# Patient Record
Sex: FEMALE | Race: WHITE | NOT HISPANIC OR LATINO | Employment: OTHER | RURAL
[De-identification: names, ages, dates, MRNs, and addresses within clinical notes are randomized per-mention and may not be internally consistent; named-entity substitution may affect disease eponyms.]

---

## 2017-02-23 ENCOUNTER — OFFICE VISIT (OUTPATIENT)
Dept: CARDIOLOGY | Facility: CLINIC | Age: 72
End: 2017-02-23

## 2017-02-23 VITALS
WEIGHT: 220 LBS | HEIGHT: 61 IN | DIASTOLIC BLOOD PRESSURE: 80 MMHG | BODY MASS INDEX: 41.54 KG/M2 | SYSTOLIC BLOOD PRESSURE: 138 MMHG | HEART RATE: 59 BPM

## 2017-02-23 DIAGNOSIS — I25.10 CORONARY ARTERY DISEASE INVOLVING NATIVE CORONARY ARTERY OF NATIVE HEART WITHOUT ANGINA PECTORIS: ICD-10-CM

## 2017-02-23 DIAGNOSIS — I25.2 OLD MI (MYOCARDIAL INFARCTION): Primary | ICD-10-CM

## 2017-02-23 PROCEDURE — 99213 OFFICE O/P EST LOW 20 MIN: CPT | Performed by: INTERNAL MEDICINE

## 2017-02-23 PROCEDURE — 93000 ELECTROCARDIOGRAM COMPLETE: CPT | Performed by: INTERNAL MEDICINE

## 2017-02-23 RX ORDER — CLONAZEPAM 0.5 MG/1
0.5 TABLET ORAL 2 TIMES DAILY
Refills: 1 | COMMUNITY
Start: 2017-01-12

## 2017-02-23 NOTE — PROGRESS NOTES
Subjective:     Encounter Date:02/23/2017      Patient ID: Rachana Membreno is a 71 y.o. female.    Chief Complaint: old MI, CAD, obesity    History of Present Illness     Dear Dr. Medrano,     I had the pleasure of seeing your patient, Rachana Membreno in cardiac followup today.  As you well know, she is a neida 71-year-old woman with history of coronary artery disease status post prior myocardial infarction.  She has had three stents placed.  Her LV function has returned to normal.      Six months ago when I last saw her she was recovering from a respiratory infection.  This has since resolved and she has not had any recurrences.      In general she describes a pretty sedentary lifestyle.  She gets out twice weekly but does not seem to be able to do chores or to get around very much without her walker.  At her current level of activity, she denies any complaints of angina or palpitations.          Review of Systems   All other systems reviewed and are negative.        ECG 12 Lead  Date/Time: 2/23/2017 12:12 PM  Performed by: CARLIN ANG  Authorized by: CARLIN ANG   Comparison: compared with previous ECG   Similar to previous ECG  Rhythm: sinus rhythm  BPM: 59  QRS axis: left  Other findings: PRWP               Objective:     Physical Exam   Constitutional: She is oriented to person, place, and time. She appears well-developed and well-nourished.   HENT:   Head: Normocephalic and atraumatic.   Neck: Normal range of motion. Neck supple.   Cardiovascular: Normal rate, regular rhythm and normal heart sounds.    Pulmonary/Chest: Effort normal and breath sounds normal.   Abdominal: Soft. Bowel sounds are normal.   Musculoskeletal: Normal range of motion.   Neurological: She is alert and oriented to person, place, and time.   Skin: Skin is warm and dry.   Psychiatric: She has a normal mood and affect. Her behavior is normal. Thought content normal.   Vitals reviewed.      Lab Review:       Assessment:           Diagnosis Plan   1. Old MI (myocardial infarction)     2. Coronary artery disease involving native coronary artery of native heart without angina pectoris            Plan:        It was a pleasure to see your patient in cardiac followup today.  She seems to be stable from the cardiac standpoint without any complaints of angina or heart failure.  I think that the biggest thing she can do to improve her situation is to improve her physical capacity.  I have given her several suggestions on how to exercise more.  She will see me again in six months or sooner if symptoms warrant.      Coronary Artery Disease  Assessment  • The patient has no angina    Plan  • Lifestyle modifications discussed include adhering to a heart healthy diet, avoidance of tobacco products, maintenance of a healthy weight, medication compliance, regular exercise and regular monitoring of cholesterol and blood pressure    Subjective - Objective  • There is a history of past MI  • There has been a previous stent procedure using NIK  • Current antiplatelet therapy includes aspirin 81 mg

## 2017-08-24 ENCOUNTER — OFFICE VISIT (OUTPATIENT)
Dept: CARDIOLOGY | Facility: CLINIC | Age: 72
End: 2017-08-24

## 2017-08-24 VITALS
BODY MASS INDEX: 44.56 KG/M2 | WEIGHT: 236 LBS | HEART RATE: 68 BPM | SYSTOLIC BLOOD PRESSURE: 140 MMHG | DIASTOLIC BLOOD PRESSURE: 82 MMHG | HEIGHT: 61 IN

## 2017-08-24 DIAGNOSIS — I25.10 CORONARY ARTERY DISEASE INVOLVING NATIVE CORONARY ARTERY OF NATIVE HEART WITHOUT ANGINA PECTORIS: ICD-10-CM

## 2017-08-24 DIAGNOSIS — I25.2 OLD MI (MYOCARDIAL INFARCTION): Primary | ICD-10-CM

## 2017-08-24 PROCEDURE — 93000 ELECTROCARDIOGRAM COMPLETE: CPT | Performed by: INTERNAL MEDICINE

## 2017-08-24 PROCEDURE — 99213 OFFICE O/P EST LOW 20 MIN: CPT | Performed by: INTERNAL MEDICINE

## 2017-08-24 RX ORDER — ALENDRONATE SODIUM 70 MG/1
70 TABLET ORAL
COMMUNITY
End: 2019-12-18

## 2017-08-24 RX ORDER — POTASSIUM CHLORIDE 750 MG/1
10 TABLET, FILM COATED, EXTENDED RELEASE ORAL 2 TIMES DAILY
COMMUNITY
End: 2019-09-11

## 2018-02-08 ENCOUNTER — OFFICE VISIT (OUTPATIENT)
Dept: CARDIOLOGY | Facility: CLINIC | Age: 73
End: 2018-02-08

## 2018-02-08 VITALS
SYSTOLIC BLOOD PRESSURE: 128 MMHG | HEART RATE: 71 BPM | BODY MASS INDEX: 43.23 KG/M2 | DIASTOLIC BLOOD PRESSURE: 66 MMHG | HEIGHT: 61 IN | WEIGHT: 229 LBS

## 2018-02-08 DIAGNOSIS — I25.2 OLD MI (MYOCARDIAL INFARCTION): Primary | ICD-10-CM

## 2018-02-08 DIAGNOSIS — I25.10 CORONARY ARTERY DISEASE INVOLVING NATIVE CORONARY ARTERY OF NATIVE HEART WITHOUT ANGINA PECTORIS: ICD-10-CM

## 2018-02-08 PROCEDURE — 99213 OFFICE O/P EST LOW 20 MIN: CPT | Performed by: INTERNAL MEDICINE

## 2018-02-08 PROCEDURE — 93000 ELECTROCARDIOGRAM COMPLETE: CPT | Performed by: INTERNAL MEDICINE

## 2018-02-08 NOTE — PROGRESS NOTES
Subjective:     Encounter Date:02/08/2018      Patient ID: Rachana Membreno is a 72 y.o. female.    Chief Complaint: CAD, old MI    History of Present Illness    Dear Dr. Medrano,     I had the pleasure of seeing your patient in cardiac followup today.  As you well know, she is a neida 72-year-old woman with history of obesity.  She has chronic obstructive pulmonary disease and is on chronic oxygen therapy.  She has coronary artery disease status post prior myocardial infarction.  She has had three stents placed.  Her LV function has returned to normal.      She comes in for her six month followup.  Over the past six months she reports doing great.  She has no change in her overall status.  She walks with a walker.  She can do self-care including bathing.  She does not do much in the way of chores around the house.      She denies any complaints of angina or heart failure.  She has had no illnesses since I have last seen her.          Review of Systems   All other systems reviewed and are negative.        ECG 12 Lead  Date/Time: 2/8/2018 10:48 AM  Performed by: CARLIN ANG  Authorized by: CARLIN ANG   Comparison: compared with previous ECG   Similar to previous ECG  Rhythm: sinus rhythm  BPM: 71  Other findings: PRWP               Objective:     Physical Exam   Constitutional: She is oriented to person, place, and time. She appears well-developed and well-nourished.   HENT:   Head: Normocephalic and atraumatic.   Neck: Normal range of motion. Neck supple.   Cardiovascular: Normal rate, regular rhythm and normal heart sounds.    Pulmonary/Chest: Effort normal and breath sounds normal.   Abdominal: Soft. Bowel sounds are normal.   Musculoskeletal: Normal range of motion.   Neurological: She is alert and oriented to person, place, and time.   Skin: Skin is warm and dry.   Psychiatric: She has a normal mood and affect. Her behavior is normal. Thought content normal.   Vitals reviewed.      Lab Review:        Assessment:          Diagnosis Plan   1. Old MI (myocardial infarction)     2. Coronary artery disease involving native coronary artery of native heart without angina pectoris            Plan:       It was a pleasure to see your patient in cardiac followup today.  She is stable from the cardiac standpoint without any complaints to suggest angina or heart failure.  Her COPD is stable.  I have not made any changes in her regimen.  I will see her again in six months or sooner if symptoms warrant.      Coronary Artery Disease  Assessment  • The patient has no angina    Plan  • Lifestyle modifications discussed include adhering to a heart healthy diet, avoidance of tobacco products, maintenance of a healthy weight, medication compliance, regular exercise and regular monitoring of cholesterol and blood pressure    Subjective - Objective  • There is a history of past MI  • There has been a previous stent procedure using NIK  • Current antiplatelet therapy includes aspirin 81 mg

## 2018-08-09 ENCOUNTER — OFFICE VISIT (OUTPATIENT)
Dept: CARDIOLOGY | Facility: CLINIC | Age: 73
End: 2018-08-09

## 2018-08-09 VITALS
BODY MASS INDEX: 42.48 KG/M2 | SYSTOLIC BLOOD PRESSURE: 124 MMHG | DIASTOLIC BLOOD PRESSURE: 68 MMHG | WEIGHT: 225 LBS | HEIGHT: 61 IN | HEART RATE: 73 BPM

## 2018-08-09 DIAGNOSIS — I25.2 OLD MI (MYOCARDIAL INFARCTION): ICD-10-CM

## 2018-08-09 DIAGNOSIS — I25.10 CORONARY ARTERY DISEASE INVOLVING NATIVE CORONARY ARTERY OF NATIVE HEART WITHOUT ANGINA PECTORIS: Primary | ICD-10-CM

## 2018-08-09 PROCEDURE — 99213 OFFICE O/P EST LOW 20 MIN: CPT | Performed by: INTERNAL MEDICINE

## 2018-08-09 PROCEDURE — 93000 ELECTROCARDIOGRAM COMPLETE: CPT | Performed by: INTERNAL MEDICINE

## 2018-08-09 RX ORDER — SILODOSIN 8 MG/1
8 CAPSULE ORAL
COMMUNITY
End: 2019-09-11

## 2018-08-09 NOTE — PROGRESS NOTES
Subjective:     Encounter Date:08/09/2018      Patient ID: Rachana Mmebreno is a 73 y.o. female.    Chief Complaint: CAD, old MI    History of Present Illness    Dear Dr. Medrano:    I had the pleasure of seeing your patient in cardiac followup today. As you well know, she is a neida 73-year-old woman with history of coronary artery disease and prior myocardial infarction. She had 3 stents placed. Her LV function returned to normal.    She has chronic obstructive pulmonary disease and is on chronic oxygen therapy at 3L. She gets around with a walker. She does self-care as her main level of activity.    She comes in for her 6-month followup.  Since I have last seen her, she reports doing quite well. She has a carotid stenosis, followed by Dr. Cook. He recommends conservative management.        Review of Systems   All other systems reviewed and are negative.        ECG 12 Lead  Date/Time: 8/9/2018 11:00 AM  Performed by: CARLIN ANG  Authorized by: CARLIN ANG   Comparison: compared with previous ECG   Similar to previous ECG  Rhythm: sinus rhythm  BPM: 73  Other findings: PRWP               Objective:     Physical Exam   Constitutional: She is oriented to person, place, and time. She appears well-developed and well-nourished.   HENT:   Head: Normocephalic and atraumatic.   Neck: Normal range of motion. Neck supple.   Cardiovascular: Normal rate, regular rhythm and normal heart sounds.    Chronic LE edema   Pulmonary/Chest: Effort normal. She has wheezes in the right middle field and the left middle field.   Abdominal: Soft. Bowel sounds are normal.   Musculoskeletal: Normal range of motion.   Neurological: She is alert and oriented to person, place, and time.   Skin: Skin is warm and dry.   Psychiatric: She has a normal mood and affect. Her behavior is normal. Thought content normal.   Vitals reviewed.      Lab Review:       Assessment:          Diagnosis Plan   1. Coronary artery disease involving  native coronary artery of native heart without angina pectoris     2. Old MI (myocardial infarction)            Plan:       It was a pleasure to see your patient in cardiac followup today.  She is doing very well from the cardiac standpoint without any complaints of angina or heart failure.  I have recommended no changes to her medical regimen.  She will see me again in six months or sooner if symptoms warrant.      Coronary Artery Disease  Assessment  • The patient has no angina    Plan  • Lifestyle modifications discussed include adhering to a heart healthy diet, avoidance of tobacco products, maintenance of a healthy weight, medication compliance, regular exercise and regular monitoring of cholesterol and blood pressure    Subjective - Objective  • There is a history of past MI  • There has been a previous stent procedure using NIK  • Current antiplatelet therapy includes aspirin 81 mg

## 2019-03-07 ENCOUNTER — OFFICE VISIT (OUTPATIENT)
Dept: CARDIOLOGY | Facility: CLINIC | Age: 74
End: 2019-03-07

## 2019-03-07 VITALS
BODY MASS INDEX: 44.75 KG/M2 | HEIGHT: 61 IN | HEART RATE: 68 BPM | DIASTOLIC BLOOD PRESSURE: 78 MMHG | WEIGHT: 237 LBS | SYSTOLIC BLOOD PRESSURE: 128 MMHG

## 2019-03-07 DIAGNOSIS — I25.10 CORONARY ARTERY DISEASE INVOLVING NATIVE CORONARY ARTERY OF NATIVE HEART WITHOUT ANGINA PECTORIS: Primary | ICD-10-CM

## 2019-03-07 DIAGNOSIS — I25.2 OLD MI (MYOCARDIAL INFARCTION): ICD-10-CM

## 2019-03-07 PROCEDURE — 93000 ELECTROCARDIOGRAM COMPLETE: CPT | Performed by: INTERNAL MEDICINE

## 2019-03-07 PROCEDURE — 99213 OFFICE O/P EST LOW 20 MIN: CPT | Performed by: INTERNAL MEDICINE

## 2019-03-07 RX ORDER — PRIMIDONE 50 MG/1
50 TABLET ORAL NIGHTLY
COMMUNITY
End: 2019-09-11

## 2019-03-07 RX ORDER — NITROFURANTOIN MACROCRYSTALS 50 MG/1
50 CAPSULE ORAL 4 TIMES DAILY
COMMUNITY
End: 2019-09-11

## 2019-03-07 RX ORDER — NITROGLYCERIN 0.4 MG/1
TABLET SUBLINGUAL
Qty: 30 TABLET | Refills: 11 | Status: SHIPPED | OUTPATIENT
Start: 2019-03-07 | End: 2019-09-11 | Stop reason: SDUPTHER

## 2019-03-07 NOTE — PROGRESS NOTES
Subjective:     Encounter Date:03/07/2019      Patient ID: Rachana Membreno is a 73 y.o. female.    Chief Complaint: CAD, old MI    History of Present Illness    Dear Dr. Medrano,     I had the pleasure of seeing Rachana Membreno in cardiac followup today. As you well know, she is a neida 73-year-old woman with history of coronary artery disease status post prior myocardial infarction. She has had 3 stents placed in the past. She has chronic obstructive pulmonary disease and is on oxygen therapy at 3L. She gets around with a walker.     She comes in for her usual 6 month followup. Since I have last seen her things have been quite stable except she has developed gallbladder problems. She also has a new symptom of tightness in her chest lasting about an hour. She says this is unlike her prior anginal complaint. It occurs at rest when she is lying flat and she just waits it out.         Review of Systems   All other systems reviewed and are negative.        ECG 12 Lead  Date/Time: 3/7/2019 10:19 AM  Performed by: Star Jung MD  Authorized by: Star Jung MD   Comparison: compared with previous ECG   Similar to previous ECG  Rhythm: sinus rhythm  BPM: 68  Q waves: III and aVF                   Objective:     Physical Exam   Constitutional: She is oriented to person, place, and time. She appears well-developed and well-nourished.   HENT:   Head: Normocephalic and atraumatic.   Neck: Normal range of motion. Neck supple.   Cardiovascular: Normal rate, regular rhythm and normal heart sounds.   Pulmonary/Chest: Effort normal and breath sounds normal.   Abdominal: Soft. Bowel sounds are normal.   Musculoskeletal: Normal range of motion.   Neurological: She is alert and oriented to person, place, and time.   Skin: Skin is warm and dry.   Psychiatric: She has a normal mood and affect. Her behavior is normal. Thought content normal.   Vitals reviewed.      Lab Review:       Assessment:          Diagnosis Plan    1. Coronary artery disease involving native coronary artery of native heart without angina pectoris     2. Old MI (myocardial infarction)            Plan:       It is a pleasure to see your patient in cardiac followup today. She is a neida 73-year-old woman with prior myocardial infarction and coronary artery disease. She has chronic lung disease and is on home oxygen. She suffers from obesity and has some gallbladder problems.     She has a new complaint of substernal tightness. This could be an anginal complaint but she says it is unlike any prior heart pain she has felt. I have recommended that she try sublingual nitroglycerin to see if it improves matters. If it does, then she may need more aggressive cardiac evaluation. She will contact me if this is the case. If the nitroglycerin does not do anything to relieve her pain, she should look for alternative explanations. She will see me again in 6 months or sooner should symptoms warrant.         Coronary Artery Disease  Assessment  • The patient has no angina    Plan  • Lifestyle modifications discussed include adhering to a heart healthy diet, avoidance of tobacco products, maintenance of a healthy weight, medication compliance, regular exercise and regular monitoring of cholesterol and blood pressure    Subjective - Objective  • There is a history of past MI  • There has been a previous stent procedure using NIK  • Current antiplatelet therapy includes aspirin 81 mg

## 2019-09-11 ENCOUNTER — OFFICE VISIT (OUTPATIENT)
Dept: CARDIOLOGY | Facility: CLINIC | Age: 74
End: 2019-09-11

## 2019-09-11 VITALS
OXYGEN SATURATION: 95 % | DIASTOLIC BLOOD PRESSURE: 78 MMHG | BODY MASS INDEX: 47.2 KG/M2 | WEIGHT: 250 LBS | HEART RATE: 64 BPM | SYSTOLIC BLOOD PRESSURE: 122 MMHG | HEIGHT: 61 IN

## 2019-09-11 DIAGNOSIS — R55 VASOVAGAL SYNCOPE: ICD-10-CM

## 2019-09-11 DIAGNOSIS — I25.10 CORONARY ARTERY DISEASE INVOLVING NATIVE CORONARY ARTERY OF NATIVE HEART WITHOUT ANGINA PECTORIS: Primary | ICD-10-CM

## 2019-09-11 DIAGNOSIS — I10 ESSENTIAL HYPERTENSION: ICD-10-CM

## 2019-09-11 DIAGNOSIS — I65.29 STENOSIS OF CAROTID ARTERY, UNSPECIFIED LATERALITY: ICD-10-CM

## 2019-09-11 PROCEDURE — 99214 OFFICE O/P EST MOD 30 MIN: CPT | Performed by: INTERNAL MEDICINE

## 2019-09-11 RX ORDER — NITROGLYCERIN 0.4 MG/1
TABLET SUBLINGUAL
Qty: 25 TABLET | Refills: 6 | Status: SHIPPED | OUTPATIENT
Start: 2019-09-11

## 2019-09-11 RX ORDER — METOCLOPRAMIDE 5 MG/1
5 TABLET ORAL
COMMUNITY
End: 2020-09-23

## 2019-09-15 NOTE — PROGRESS NOTES
Date of Office Visit: 2019  Encounter Provider: Jonatan Dougherty MD  Place of Service: [facility] CARDIOLOGY  Patient Name: Rachana Membreno  :1945    Chief complaint: Follow-up for coronary artery disease, carotid artery disease,   hypertension, and prior vasovagal syncope.    History of Present Illness:    I had the pleasure of seeing the patient in cardiology office on 2019.  She is a very pleasant 74 year-old white female with a past medical history significant for coronary artery disease, carotid artery disease, severe COPD, and vasovagal syncope.  The patient has formerly followed with Dr. Jung, although she will be switching care to me today.    The patient has a history of 2 separate myocardial infarctions in  and , respectively.  She had a total of 3 stents placed during those occasions by Dr. Watt.  Her anginal equivalent during those occasions was left arm and upper back pain.  She actually never had chest pain.  She also has a history of carotid artery disease, and vasovagal syncope which was proven by a tilt table test.  She has done well with the vasovagal syncope since the addition of a beta-blocker.  She also has a history of orthostatic hypotension remotely.  She has severe COPD, and is on 3 L of oxygen chronically.    The patient presents to establish care with me today.  She has been doing fairly well.  Her shortness of breath is unchanged, and occurs with mild to moderate exertion.  She has not had any chest, left arm, or back pain.  She is very fatigued in general, although this has not changed.  Her blood pressure has been controlled, and is 122/78 today.    Past Medical History:   Diagnosis Date   • Breast cancer (CMS/HCC)    • Carotid artery disease (CMS/HCC)    • COPD (chronic obstructive pulmonary disease) (CMS/HCC)    • Coronary artery disease     MI in  and  - status post 3 stents by Dr. Watt    •  Hyperlipidemia    • Hypertension    • Obesity    • Orthostatic hypotension    • Vasovagal syncope        Past Surgical History:   Procedure Laterality Date   • CAROTID STENT         Current Outpatient Medications on File Prior to Visit   Medication Sig Dispense Refill   • albuterol (PROVENTIL) (2.5 MG/3ML) 0.083% nebulizer solution USE 1 VIAL VIA NEBULIZER EVERY 4 HOURS  5   • alendronate (FOSAMAX) 70 MG tablet Take 70 mg by mouth Every 7 (Seven) Days.     • aspirin 81 MG tablet Take 81 mg by mouth daily.       • atorvastatin (LIPITOR) 10 MG tablet Take 10 mg by mouth daily.       • BREO ELLIPTA 100-25 MCG/INH aerosol powder  1 EACH IH DAILY  3   • bumetanide (BUMEX) 1 MG tablet Take 1 mg by mouth Daily.     • clonazePAM (KlonoPIN) 0.5 MG tablet TAKE 1 TABLET BY MOUTH AS DIRECTED  1   • DEXILANT 60 MG capsule Take 60 mg by mouth Daily.     • fenofibrate (TRICOR) 145 MG tablet Take 145 mg by mouth daily.       • metoclopramide (REGLAN) 5 MG tablet Take 5 mg by mouth 4 (Four) Times a Day Before Meals & at Bedtime.     • metoprolol tartrate (LOPRESSOR) 25 MG tablet TAKE 1 TABLET BY MOUTH TWICE A DAY 60 tablet 2   • pioglitazone (ACTOS) 30 MG tablet Take 30 mg by mouth Daily.       No current facility-administered medications on file prior to visit.      Allergies as of 09/11/2019 - Reviewed 03/07/2019   Allergen Reaction Noted   • Citalopram Anaphylaxis 02/08/2018   • Alprazolam Angioedema 02/08/2018   • Azithromycin  01/14/2016   • Bactrim [sulfamethoxazole-trimethoprim]  01/14/2016   • Cefuroxime  01/14/2016   • Ciprofloxacin  01/14/2016   • Clopidogrel  01/14/2016   • Doxycycline Irritability 02/08/2018   • Gabapentin  01/14/2016   • Latex  01/14/2016   • Sulfamethoxazole  03/31/2016   • Zyrtec [cetirizine]  01/14/2016     Social History     Socioeconomic History   • Marital status:      Spouse name: Not on file   • Number of children: Not on file   • Years of education: Not on file   • Highest education  "level: Not on file   Tobacco Use   • Smoking status: Former Smoker   • Smokeless tobacco: Never Used   Substance and Sexual Activity   • Alcohol use: No   • Drug use: No     Family History   Problem Relation Age of Onset   • Heart disease Father        Review of Systems   Constitution: Positive for weakness and malaise/fatigue.   Cardiovascular: Positive for dyspnea on exertion and leg swelling.   Musculoskeletal: Positive for back pain and joint pain.   All other systems reviewed and are negative.     Objective:     Vitals:    09/11/19 1203   BP: 122/78   Pulse: 64   SpO2: 95%   Weight: 113 kg (250 lb)   Height: 154.9 cm (60.98\")     Body mass index is 47.26 kg/m².    Physical Exam   Constitutional: She is oriented to person, place, and time. She appears well-developed.   Obese   HENT:   Head: Normocephalic and atraumatic.   Eyes: Conjunctivae are normal.   Neck: Neck supple.   Cardiovascular: Normal rate and regular rhythm. Exam reveals no gallop and no friction rub.   Murmur heard.   Systolic murmur is present with a grade of 2/6 at the upper right sternal border and upper left sternal border.  Pulmonary/Chest: Effort normal. She has decreased breath sounds.   Abdominal: Soft. There is no tenderness.   Musculoskeletal:   1+ edema of the lower extremities (chronic)   Neurological: She is alert and oriented to person, place, and time.   Skin: Skin is warm.   Psychiatric: She has a normal mood and affect. Her behavior is normal.     Lab Review:   Procedures    Cardiac Procedures:  1.  Echocardiogram on 5/17/2017: The ejection fraction was 60%.  The left ventricle was   mildly enlarged.  There was moderate left atrial enlargement.  The right ventricle was   normal.  There was a small pericardial effusion.    Assessment:       Diagnosis Plan   1. Coronary artery disease involving native coronary artery of native heart without angina pectoris     2. Stenosis of right carotid artery     3. Essential hypertension     4. " Vasovagal syncope       Plan:       The patient seems to be stable.  Again, she always has significant shortness of breath because of her COPD.  However, this has not changed significantly.  She has had no anginal symptoms.  Her echocardiogram in May 2017 showed a normal ejection fraction and no significant valvular disease.  She has not had recent orthostatic hypotension, and her blood pressure has been well controlled.  She has not had any significant vasovagal episodes since the addition of metoprolol.  She will continue on the aspirin and Lipitor for her coronary artery disease.  She will also continue on the Bumex at 1 mg/day, and she appears to be euvolemic.  She does have chronic lower extremity edema, which is relatively unchanged per her account.  She is also taking TriCor for her triglycerides.     Of note, she does have a history of significant carotid artery disease on the left, with a documented 80 to 99% stenosis in 2015.  She did see Dr. Cook in 2018, and it was felt that the risk of a carotid endarterectomy was too great for her given her multiple comorbidities.  However, she also has mention of a carotid stent in her chart.  I did not realize this until after her visit.  I will need to clarify this in the future.  For now, I will plan on seeing her back in the office in 6 months.    Coronary Artery Disease  Assessment  • The patient has no angina    Plan  • Lifestyle modifications discussed include adhering to a heart healthy diet, avoidance of tobacco products, maintenance of a healthy weight, medication compliance, regular exercise and regular monitoring of cholesterol and blood pressure    Subjective - Objective  • There is a history of past MI in 2003 and 2004  • There has been a previous stent procedure using NIK  • Current antiplatelet therapy includes aspirin 81 mg

## 2019-11-26 ENCOUNTER — TELEPHONE (OUTPATIENT)
Dept: CARDIOLOGY | Facility: CLINIC | Age: 74
End: 2019-11-26

## 2019-12-16 NOTE — TELEPHONE ENCOUNTER
Ama,   I have just gotten  A cancellation on this Wednesday 12/18/19@ 1:40 pm   At [facility] office.   Will you please call pt with appt/time and date. If she can't make that appt it will have to be first available.   Thanks Roberto NATION

## 2019-12-18 ENCOUNTER — OFFICE VISIT (OUTPATIENT)
Dept: CARDIOLOGY | Facility: CLINIC | Age: 74
End: 2019-12-18

## 2019-12-18 VITALS
OXYGEN SATURATION: 94 % | SYSTOLIC BLOOD PRESSURE: 114 MMHG | HEIGHT: 61 IN | BODY MASS INDEX: 47.26 KG/M2 | HEART RATE: 70 BPM | DIASTOLIC BLOOD PRESSURE: 68 MMHG

## 2019-12-18 DIAGNOSIS — R55 VASOVAGAL SYNCOPE: ICD-10-CM

## 2019-12-18 DIAGNOSIS — I10 ESSENTIAL HYPERTENSION: ICD-10-CM

## 2019-12-18 DIAGNOSIS — I77.9 CAROTID ARTERY DISEASE, UNSPECIFIED LATERALITY, UNSPECIFIED TYPE (HCC): ICD-10-CM

## 2019-12-18 DIAGNOSIS — I25.10 CORONARY ARTERY DISEASE INVOLVING NATIVE CORONARY ARTERY OF NATIVE HEART WITHOUT ANGINA PECTORIS: Primary | ICD-10-CM

## 2019-12-18 PROCEDURE — 99214 OFFICE O/P EST MOD 30 MIN: CPT | Performed by: INTERNAL MEDICINE

## 2019-12-18 RX ORDER — BLOOD SUGAR DIAGNOSTIC
STRIP MISCELLANEOUS
Refills: 5 | Status: ON HOLD | COMMUNITY
Start: 2019-12-06 | End: 2019-12-20

## 2019-12-18 RX ORDER — DIPHENOXYLATE HYDROCHLORIDE AND ATROPINE SULFATE 2.5; .025 MG/1; MG/1
TABLET ORAL
Refills: 1 | Status: ON HOLD | COMMUNITY
Start: 2019-12-08 | End: 2019-12-20

## 2019-12-18 RX ORDER — LANCETS 30 GAUGE
EACH MISCELLANEOUS 2 TIMES DAILY
Refills: 5 | Status: ON HOLD | COMMUNITY
Start: 2019-12-05 | End: 2019-12-20

## 2019-12-18 RX ORDER — MIRABEGRON 50 MG/1
50 TABLET, FILM COATED, EXTENDED RELEASE ORAL DAILY
Refills: 3 | COMMUNITY
Start: 2019-11-30

## 2019-12-18 RX ORDER — SILODOSIN 8 MG/1
8 CAPSULE ORAL DAILY
Refills: 0 | COMMUNITY
Start: 2019-12-05 | End: 2020-09-23

## 2019-12-18 RX ORDER — SITAGLIPTIN 100 MG/1
100 TABLET, FILM COATED ORAL DAILY
Refills: 5 | COMMUNITY
Start: 2019-12-11

## 2019-12-18 RX ORDER — RANITIDINE 300 MG/1
300 TABLET ORAL NIGHTLY
Refills: 6 | COMMUNITY
Start: 2019-11-22 | End: 2020-09-23

## 2019-12-18 RX ORDER — TERAZOSIN 2 MG/1
2 CAPSULE ORAL DAILY
Refills: 0 | Status: ON HOLD | COMMUNITY
Start: 2019-12-03 | End: 2019-12-20

## 2019-12-18 RX ORDER — POTASSIUM CHLORIDE 750 MG/1
10 TABLET, FILM COATED, EXTENDED RELEASE ORAL 2 TIMES DAILY
Refills: 10 | COMMUNITY
Start: 2019-12-05

## 2019-12-18 RX ORDER — FUROSEMIDE 20 MG/1
20 TABLET ORAL DAILY
Refills: 0 | COMMUNITY
Start: 2019-11-27 | End: 2019-12-23 | Stop reason: HOSPADM

## 2019-12-18 RX ORDER — PRIMIDONE 50 MG/1
25 TABLET ORAL NIGHTLY
Refills: 1 | COMMUNITY
Start: 2019-10-30 | End: 2020-09-23 | Stop reason: SDUPTHER

## 2019-12-20 ENCOUNTER — APPOINTMENT (OUTPATIENT)
Dept: GENERAL RADIOLOGY | Facility: HOSPITAL | Age: 74
End: 2019-12-20

## 2019-12-20 ENCOUNTER — TELEPHONE (OUTPATIENT)
Dept: CARDIOLOGY | Facility: CLINIC | Age: 74
End: 2019-12-20

## 2019-12-20 ENCOUNTER — HOSPITAL ENCOUNTER (OUTPATIENT)
Facility: HOSPITAL | Age: 74
Setting detail: OBSERVATION
Discharge: HOME | End: 2019-12-23
Attending: INTERNAL MEDICINE | Admitting: INTERNAL MEDICINE

## 2019-12-20 ENCOUNTER — APPOINTMENT (OUTPATIENT)
Dept: CARDIOLOGY | Facility: HOSPITAL | Age: 74
End: 2019-12-20

## 2019-12-20 LAB
ALBUMIN SERPL-MCNC: 3.6 G/DL (ref 3.5–5.2)
ALBUMIN/GLOB SERPL: 1 G/DL
ALP SERPL-CCNC: 107 U/L (ref 39–117)
ALT SERPL W P-5'-P-CCNC: 20 U/L (ref 1–33)
ANION GAP SERPL CALCULATED.3IONS-SCNC: 13.3 MMOL/L (ref 5–15)
AORTIC DIMENSIONLESS INDEX: 0.8 (DI)
AST SERPL-CCNC: 23 U/L (ref 1–32)
BACTERIA UR QL AUTO: ABNORMAL /HPF
BH CV ECHO MEAS - ACS: 1.8 CM
BH CV ECHO MEAS - AO MAX PG (FULL): 4.1 MMHG
BH CV ECHO MEAS - AO MAX PG: 11 MMHG
BH CV ECHO MEAS - AO MEAN PG (FULL): 2 MMHG
BH CV ECHO MEAS - AO MEAN PG: 5 MMHG
BH CV ECHO MEAS - AO ROOT AREA (BSA CORRECTED): 1.4
BH CV ECHO MEAS - AO ROOT AREA: 6.2 CM^2
BH CV ECHO MEAS - AO ROOT DIAM: 2.8 CM
BH CV ECHO MEAS - AO V2 MAX: 166 CM/SEC
BH CV ECHO MEAS - AO V2 MEAN: 106 CM/SEC
BH CV ECHO MEAS - AO V2 VTI: 35.5 CM
BH CV ECHO MEAS - ASC AORTA: 2.8 CM
BH CV ECHO MEAS - AVA(I,A): 2 CM^2
BH CV ECHO MEAS - AVA(I,D): 2 CM^2
BH CV ECHO MEAS - AVA(V,A): 2 CM^2
BH CV ECHO MEAS - AVA(V,D): 2 CM^2
BH CV ECHO MEAS - BSA(HAYCOCK): 2.2 M^2
BH CV ECHO MEAS - BSA: 2 M^2
BH CV ECHO MEAS - BZI_BMI: 45.5 KILOGRAMS/M^2
BH CV ECHO MEAS - BZI_METRIC_HEIGHT: 154.9 CM
BH CV ECHO MEAS - BZI_METRIC_WEIGHT: 109.3 KG
BH CV ECHO MEAS - EDV(CUBED): 110.6 ML
BH CV ECHO MEAS - EDV(MOD-SP2): 84 ML
BH CV ECHO MEAS - EDV(MOD-SP4): 81 ML
BH CV ECHO MEAS - EDV(TEICH): 107.5 ML
BH CV ECHO MEAS - EF(CUBED): 77.9 %
BH CV ECHO MEAS - EF(MOD-BP): 64 %
BH CV ECHO MEAS - EF(MOD-SP2): 64.3 %
BH CV ECHO MEAS - EF(MOD-SP4): 63 %
BH CV ECHO MEAS - EF(TEICH): 70 %
BH CV ECHO MEAS - ESV(CUBED): 24.4 ML
BH CV ECHO MEAS - ESV(MOD-SP2): 30 ML
BH CV ECHO MEAS - ESV(MOD-SP4): 30 ML
BH CV ECHO MEAS - ESV(TEICH): 32.2 ML
BH CV ECHO MEAS - FS: 39.6 %
BH CV ECHO MEAS - IVS/LVPW: 1
BH CV ECHO MEAS - IVSD: 0.9 CM
BH CV ECHO MEAS - LAT PEAK E' VEL: 6 CM/SEC
BH CV ECHO MEAS - LV DIASTOLIC VOL/BSA (35-75): 39.6 ML/M^2
BH CV ECHO MEAS - LV MASS(C)D: 147.8 GRAMS
BH CV ECHO MEAS - LV MASS(C)DI: 72.3 GRAMS/M^2
BH CV ECHO MEAS - LV MAX PG: 7 MMHG
BH CV ECHO MEAS - LV MEAN PG: 3 MMHG
BH CV ECHO MEAS - LV SYSTOLIC VOL/BSA (12-30): 14.7 ML/M^2
BH CV ECHO MEAS - LV V1 MAX: 132 CM/SEC
BH CV ECHO MEAS - LV V1 MEAN: 76.5 CM/SEC
BH CV ECHO MEAS - LV V1 VTI: 27.6 CM
BH CV ECHO MEAS - LVIDD: 4.8 CM
BH CV ECHO MEAS - LVIDS: 2.9 CM
BH CV ECHO MEAS - LVLD AP2: 7.8 CM
BH CV ECHO MEAS - LVLD AP4: 7.6 CM
BH CV ECHO MEAS - LVLS AP2: 6.4 CM
BH CV ECHO MEAS - LVLS AP4: 6.3 CM
BH CV ECHO MEAS - LVOT AREA (M): 2.5 CM^2
BH CV ECHO MEAS - LVOT AREA: 2.5 CM^2
BH CV ECHO MEAS - LVOT DIAM: 1.8 CM
BH CV ECHO MEAS - LVPWD: 0.9 CM
BH CV ECHO MEAS - MED PEAK E' VEL: 7 CM/SEC
BH CV ECHO MEAS - MR MAX PG: 82.4 MMHG
BH CV ECHO MEAS - MR MAX VEL: 454 CM/SEC
BH CV ECHO MEAS - MV A DUR: 0.18 SEC
BH CV ECHO MEAS - MV A MAX VEL: 113 CM/SEC
BH CV ECHO MEAS - MV DEC SLOPE: 653 CM/SEC^2
BH CV ECHO MEAS - MV DEC TIME: 240 SEC
BH CV ECHO MEAS - MV E MAX VEL: 104 CM/SEC
BH CV ECHO MEAS - MV E/A: 0.92
BH CV ECHO MEAS - MV MAX PG: 6.9 MMHG
BH CV ECHO MEAS - MV MEAN PG: 3 MMHG
BH CV ECHO MEAS - MV P1/2T MAX VEL: 119 CM/SEC
BH CV ECHO MEAS - MV P1/2T: 53.4 MSEC
BH CV ECHO MEAS - MV V2 MAX: 131 CM/SEC
BH CV ECHO MEAS - MV V2 MEAN: 73 CM/SEC
BH CV ECHO MEAS - MV V2 VTI: 35.5 CM
BH CV ECHO MEAS - MVA P1/2T LCG: 1.8 CM^2
BH CV ECHO MEAS - MVA(P1/2T): 4.1 CM^2
BH CV ECHO MEAS - MVA(VTI): 2 CM^2
BH CV ECHO MEAS - PA ACC TIME: 0.1 SEC
BH CV ECHO MEAS - PA MAX PG (FULL): 2.5 MMHG
BH CV ECHO MEAS - PA MAX PG: 4.6 MMHG
BH CV ECHO MEAS - PA PR(ACCEL): 34 MMHG
BH CV ECHO MEAS - PA V2 MAX: 107 CM/SEC
BH CV ECHO MEAS - PULM A REVS DUR: 0.17 SEC
BH CV ECHO MEAS - PULM A REVS VEL: 52.4 CM/SEC
BH CV ECHO MEAS - PULM DIAS VEL: 71.2 CM/SEC
BH CV ECHO MEAS - PULM S/D: 0.67
BH CV ECHO MEAS - PULM SYS VEL: 48 CM/SEC
BH CV ECHO MEAS - PVA(V,A): 2.3 CM^2
BH CV ECHO MEAS - PVA(V,D): 2.3 CM^2
BH CV ECHO MEAS - QP/QS: 0.84
BH CV ECHO MEAS - RAP SYSTOLE: 3 MMHG
BH CV ECHO MEAS - RV MAX PG: 2.1 MMHG
BH CV ECHO MEAS - RV MEAN PG: 1 MMHG
BH CV ECHO MEAS - RV V1 MAX: 72.4 CM/SEC
BH CV ECHO MEAS - RV V1 MEAN: 48.1 CM/SEC
BH CV ECHO MEAS - RV V1 VTI: 17 CM
BH CV ECHO MEAS - RVOT AREA: 3.5 CM^2
BH CV ECHO MEAS - RVOT DIAM: 2.1 CM
BH CV ECHO MEAS - RVSP: 37.6 MMHG
BH CV ECHO MEAS - SI(AO): 106.9 ML/M^2
BH CV ECHO MEAS - SI(CUBED): 42.2 ML/M^2
BH CV ECHO MEAS - SI(LVOT): 34.3 ML/M^2
BH CV ECHO MEAS - SI(MOD-SP2): 26.4 ML/M^2
BH CV ECHO MEAS - SI(MOD-SP4): 24.9 ML/M^2
BH CV ECHO MEAS - SI(TEICH): 36.8 ML/M^2
BH CV ECHO MEAS - SV(AO): 218.6 ML
BH CV ECHO MEAS - SV(CUBED): 86.2 ML
BH CV ECHO MEAS - SV(LVOT): 70.2 ML
BH CV ECHO MEAS - SV(MOD-SP2): 54 ML
BH CV ECHO MEAS - SV(MOD-SP4): 51 ML
BH CV ECHO MEAS - SV(RVOT): 58.9 ML
BH CV ECHO MEAS - SV(TEICH): 75.3 ML
BH CV ECHO MEAS - TAPSE (>1.6): 2 CM
BH CV ECHO MEAS - TR MAX VEL: 294 CM/SEC
BH CV ECHO MEASUREMENTS AVERAGE E/E' RATIO: 16
BH CV VAS BP RIGHT ARM: NORMAL MMHG
BH CV XLRA - RV BASE: 3.5 CM
BH CV XLRA - TDI S': 15 CM/SEC
BILIRUB SERPL-MCNC: 0.5 MG/DL (ref 0.2–1.2)
BILIRUB UR QL STRIP: NEGATIVE
BUN BLD-MCNC: 25 MG/DL (ref 8–23)
BUN/CREAT SERPL: 17.9 (ref 7–25)
CALCIUM SPEC-SCNC: 9.5 MG/DL (ref 8.6–10.5)
CHLORIDE SERPL-SCNC: 99 MMOL/L (ref 98–107)
CLARITY UR: CLEAR
CO2 SERPL-SCNC: 29.7 MMOL/L (ref 22–29)
COLOR UR: YELLOW
CREAT BLD-MCNC: 1.4 MG/DL (ref 0.57–1)
CREAT UR-MCNC: 138.6 MG/DL
GFR SERPL CREATININE-BSD FRML MDRD: 37 ML/MIN/1.73
GLOBULIN UR ELPH-MCNC: 3.5 GM/DL
GLUCOSE BLD-MCNC: 128 MG/DL (ref 65–99)
GLUCOSE BLDC GLUCOMTR-MCNC: 116 MG/DL (ref 70–130)
GLUCOSE BLDC GLUCOMTR-MCNC: 124 MG/DL (ref 70–130)
GLUCOSE UR STRIP-MCNC: NEGATIVE MG/DL
HGB UR QL STRIP.AUTO: NEGATIVE
HYALINE CASTS UR QL AUTO: ABNORMAL /LPF
KETONES UR QL STRIP: NEGATIVE
LEFT ATRIUM VOLUME INDEX: 27 ML/M2
LEFT ATRIUM VOLUME: 50 CM3
LEUKOCYTE ESTERASE UR QL STRIP.AUTO: ABNORMAL
NITRITE UR QL STRIP: NEGATIVE
NT-PROBNP SERPL-MCNC: 109.9 PG/ML (ref 5–900)
PH UR STRIP.AUTO: <=5 [PH] (ref 5–8)
POTASSIUM BLD-SCNC: 3.7 MMOL/L (ref 3.5–5.2)
PROT SERPL-MCNC: 7.1 G/DL (ref 6–8.5)
PROT UR QL STRIP: NEGATIVE
PROT UR-MCNC: 15 MG/DL
RBC # UR: ABNORMAL /HPF
REF LAB TEST METHOD: ABNORMAL
SODIUM BLD-SCNC: 142 MMOL/L (ref 136–145)
SODIUM UR-SCNC: 62 MMOL/L
SP GR UR STRIP: 1.02 (ref 1–1.03)
SQUAMOUS #/AREA URNS HPF: ABNORMAL /HPF
TROPONIN T SERPL-MCNC: <0.01 NG/ML (ref 0–0.03)
UROBILINOGEN UR QL STRIP: ABNORMAL
WBC UR QL AUTO: ABNORMAL /HPF

## 2019-12-20 PROCEDURE — 83880 ASSAY OF NATRIURETIC PEPTIDE: CPT | Performed by: NURSE PRACTITIONER

## 2019-12-20 PROCEDURE — G0378 HOSPITAL OBSERVATION PER HR: HCPCS

## 2019-12-20 PROCEDURE — 84484 ASSAY OF TROPONIN QUANT: CPT | Performed by: NURSE PRACTITIONER

## 2019-12-20 PROCEDURE — 82570 ASSAY OF URINE CREATININE: CPT | Performed by: INTERNAL MEDICINE

## 2019-12-20 PROCEDURE — 99223 1ST HOSP IP/OBS HIGH 75: CPT | Performed by: NURSE PRACTITIONER

## 2019-12-20 PROCEDURE — 93010 ELECTROCARDIOGRAM REPORT: CPT | Performed by: INTERNAL MEDICINE

## 2019-12-20 PROCEDURE — 93306 TTE W/DOPPLER COMPLETE: CPT

## 2019-12-20 PROCEDURE — 96374 THER/PROPH/DIAG INJ IV PUSH: CPT

## 2019-12-20 PROCEDURE — 93005 ELECTROCARDIOGRAM TRACING: CPT | Performed by: NURSE PRACTITIONER

## 2019-12-20 PROCEDURE — 84300 ASSAY OF URINE SODIUM: CPT | Performed by: INTERNAL MEDICINE

## 2019-12-20 PROCEDURE — 93306 TTE W/DOPPLER COMPLETE: CPT | Performed by: INTERNAL MEDICINE

## 2019-12-20 PROCEDURE — 80053 COMPREHEN METABOLIC PANEL: CPT | Performed by: NURSE PRACTITIONER

## 2019-12-20 PROCEDURE — 84156 ASSAY OF PROTEIN URINE: CPT | Performed by: INTERNAL MEDICINE

## 2019-12-20 PROCEDURE — 25010000002 FUROSEMIDE PER 20 MG: Performed by: NURSE PRACTITIONER

## 2019-12-20 PROCEDURE — 81001 URINALYSIS AUTO W/SCOPE: CPT | Performed by: INTERNAL MEDICINE

## 2019-12-20 PROCEDURE — 71046 X-RAY EXAM CHEST 2 VIEWS: CPT

## 2019-12-20 PROCEDURE — 82962 GLUCOSE BLOOD TEST: CPT

## 2019-12-20 RX ORDER — ASPIRIN 81 MG/1
81 TABLET ORAL DAILY
Status: DISCONTINUED | OUTPATIENT
Start: 2019-12-20 | End: 2019-12-23 | Stop reason: HOSPADM

## 2019-12-20 RX ORDER — TERAZOSIN 2 MG/1
2 CAPSULE ORAL DAILY
Status: DISCONTINUED | OUTPATIENT
Start: 2019-12-20 | End: 2019-12-23 | Stop reason: HOSPADM

## 2019-12-20 RX ORDER — BUDESONIDE AND FORMOTEROL FUMARATE DIHYDRATE 80; 4.5 UG/1; UG/1
2 AEROSOL RESPIRATORY (INHALATION) DAILY
Status: DISCONTINUED | OUTPATIENT
Start: 2019-12-20 | End: 2019-12-23 | Stop reason: HOSPADM

## 2019-12-20 RX ORDER — FUROSEMIDE 10 MG/ML
40 INJECTION INTRAMUSCULAR; INTRAVENOUS EVERY 8 HOURS
Status: DISCONTINUED | OUTPATIENT
Start: 2019-12-20 | End: 2019-12-21

## 2019-12-20 RX ORDER — FAMOTIDINE 40 MG/1
40 TABLET, FILM COATED ORAL DAILY
Status: DISCONTINUED | OUTPATIENT
Start: 2019-12-20 | End: 2019-12-21

## 2019-12-20 RX ORDER — DEXTROSE MONOHYDRATE 25 G/50ML
25 INJECTION, SOLUTION INTRAVENOUS
Status: DISCONTINUED | OUTPATIENT
Start: 2019-12-20 | End: 2019-12-23 | Stop reason: HOSPADM

## 2019-12-20 RX ORDER — ESTRADIOL 0.1 MG/G
2 CREAM VAGINAL 2 TIMES WEEKLY
COMMUNITY

## 2019-12-20 RX ORDER — NICOTINE POLACRILEX 4 MG
15 LOZENGE BUCCAL
Status: DISCONTINUED | OUTPATIENT
Start: 2019-12-20 | End: 2019-12-23 | Stop reason: HOSPADM

## 2019-12-20 RX ORDER — POTASSIUM CHLORIDE 750 MG/1
10 CAPSULE, EXTENDED RELEASE ORAL 2 TIMES DAILY WITH MEALS
Status: DISCONTINUED | OUTPATIENT
Start: 2019-12-20 | End: 2019-12-23 | Stop reason: HOSPADM

## 2019-12-20 RX ORDER — ATORVASTATIN CALCIUM 10 MG/1
10 TABLET, FILM COATED ORAL DAILY
Status: DISCONTINUED | OUTPATIENT
Start: 2019-12-20 | End: 2019-12-23 | Stop reason: HOSPADM

## 2019-12-20 RX ORDER — METOCLOPRAMIDE 5 MG/1
5 TABLET ORAL
Status: DISCONTINUED | OUTPATIENT
Start: 2019-12-20 | End: 2019-12-23 | Stop reason: HOSPADM

## 2019-12-20 RX ORDER — PRIMIDONE 50 MG/1
25 TABLET ORAL NIGHTLY
Status: DISCONTINUED | OUTPATIENT
Start: 2019-12-20 | End: 2019-12-23 | Stop reason: HOSPADM

## 2019-12-20 RX ORDER — ALBUTEROL SULFATE 2.5 MG/3ML
2.5 SOLUTION RESPIRATORY (INHALATION) EVERY 6 HOURS PRN
Status: DISCONTINUED | OUTPATIENT
Start: 2019-12-20 | End: 2019-12-23 | Stop reason: HOSPADM

## 2019-12-20 RX ORDER — CLONAZEPAM 0.5 MG/1
0.5 TABLET ORAL 2 TIMES DAILY PRN
Status: DISCONTINUED | OUTPATIENT
Start: 2019-12-20 | End: 2019-12-23 | Stop reason: HOSPADM

## 2019-12-20 RX ADMIN — METOPROLOL TARTRATE 25 MG: 25 TABLET ORAL at 21:37

## 2019-12-20 RX ADMIN — FAMOTIDINE 40 MG: 40 TABLET, FILM COATED ORAL at 17:19

## 2019-12-20 RX ADMIN — PRIMIDONE 25 MG: 50 TABLET ORAL at 21:36

## 2019-12-20 RX ADMIN — METOCLOPRAMIDE 5 MG: 5 TABLET ORAL at 21:37

## 2019-12-20 RX ADMIN — FUROSEMIDE 40 MG: 40 INJECTION, SOLUTION INTRAMUSCULAR; INTRAVENOUS at 19:33

## 2019-12-20 RX ADMIN — TERAZOSIN HYDROCHLORIDE ANHYDROUS 2 MG: 2 CAPSULE ORAL at 17:19

## 2019-12-20 RX ADMIN — POTASSIUM CHLORIDE 10 MEQ: 750 CAPSULE, EXTENDED RELEASE ORAL at 17:19

## 2019-12-20 RX ADMIN — METOCLOPRAMIDE 5 MG: 5 TABLET ORAL at 17:19

## 2019-12-20 RX ADMIN — ATORVASTATIN CALCIUM 10 MG: 10 TABLET, FILM COATED ORAL at 21:37

## 2019-12-20 NOTE — TELEPHONE ENCOUNTER
"12/20/19  8:57 AM  Rachana Membreno  1945  Home Phone [phone]     Dr. Dougherty,    You saw Rachana in office on Wednesday, 12-18-19. She called this morning and said, \"Dr. Dougherty wants me to come in for a heart test.\" She said you are expecting her call. She is still having the chest heaviness. Her BLE edema is just a little better.    Do you have recommendations for her?    Thank you!    Radha Guardado, RN  Triage [facility] [place]        "

## 2019-12-20 NOTE — TELEPHONE ENCOUNTER
Dr. Dougherty,    I was in the middle of typing the first note and she called back again asking for you.    Radha Guardado RN  Triage [facility] [place]

## 2019-12-20 NOTE — TELEPHONE ENCOUNTER
Spoke with patient. Went over orders. Gave her directions to the hospital. She verbalized understanding.    Thank you!    Radha Guardado RN  Triage [facility] [place]

## 2019-12-20 NOTE — TELEPHONE ENCOUNTER
Spoke with Dr. Dougherty. Orders given:    -Direct Admit to the hospital today   -Telemetry Bed  -Acute and chronic diastolic heart failure  -Sandro to see today for probable IV diuresis    Thank you!    Radha Guardado RN  Triage [facility] [place]

## 2019-12-20 NOTE — H&P
Date of Hospital Visit:19  Encounter Provider: PRINCE Garrett  Place of Service: [facility] CARDIOLOGY  Patient Name: Rachana Membreno  :1945  Referral Provider: Jonatan Dougherty MD    Chief complaint: Shortness of breath and orthopnea      History of Present Illness:    This is a 74-year-old female with history of severe COPD maintained on 3 L of oxygen, hypertension, hyperlipidemia, carotid artery disease, coronary artery disease with prior myocardial infarction and coronary artery stent placement, diastolic congestive heart failure, morbid obesity, breast cancer, renal insufficiency, and orthostatic hypotension.    In May 2017 she had an echocardiogram which showed normal left ventricular systolic function.  She normally receives care at [facility] and reports of recent admission where she had an echocardiogram, received IV diuretic and was discharged.    She then saw Dr. Dougherty this past Wednesday and adjustments were made to her medication for persistent shortness of breath and lower extremity edema.  Her lower extremity edema improved however she has developed orthopnea and increased shortness of breath.  She was directly admitted by Dr. Dougherty after notifying him of worsening symptoms.  She currently denies chest pain tightness pressure but has some chest heaviness with breathing.  She has been using 3 L of oxygen at home and following a low-sodium diet.      Past Medical History:   Diagnosis Date   • Breast cancer (CMS/HCC)    • Carotid artery disease (CMS/HCC)    • COPD (chronic obstructive pulmonary disease) (CMS/HCC)    • Coronary artery disease     MI in  and  - status post 3 stents by Dr. Watt    • Hyperlipidemia    • Hypertension    • Obesity    • Orthostatic hypotension    • Vasovagal syncope        Past Surgical History:   Procedure Laterality Date   • BREAST LUMPECTOMY Right    • CAROTID STENT         No  current facility-administered medications on file prior to encounter.      Current Outpatient Medications on File Prior to Encounter   Medication Sig Dispense Refill   • albuterol (PROVENTIL) (2.5 MG/3ML) 0.083% nebulizer solution USE 1 VIAL VIA NEBULIZER EVERY 4 HOURS  5   • aspirin 81 MG tablet Take 81 mg by mouth daily.       • atorvastatin (LIPITOR) 10 MG tablet Take 10 mg by mouth daily.       • BREO ELLIPTA 100-25 MCG/INH aerosol powder  1 EACH IH DAILY  3   • clonazePAM (KlonoPIN) 0.5 MG tablet Take 0.5 mg by mouth 2 (Two) Times a Day.  1   • estradiol (ESTRACE) 0.1 MG/GM vaginal cream Insert 2 g into the vagina 2 (Two) Times a Week. Sunday and Thursday     • furosemide (LASIX) 20 MG tablet Take 20 mg by mouth Daily.  0   • JANUVIA 100 MG tablet Take 100 mg by mouth Daily.  5   • metoclopramide (REGLAN) 5 MG tablet Take 5 mg by mouth 4 (Four) Times a Day Before Meals & at Bedtime.     • metoprolol tartrate (LOPRESSOR) 25 MG tablet TAKE 1 TABLET BY MOUTH TWICE A DAY 60 tablet 2   • MYRBETRIQ 50 MG tablet sustained-release 24 hour 24 hr tablet Take 50 mg by mouth Daily.  3   • nitroglycerin (NITROSTAT) 0.4 MG SL tablet 1 under the tongue as needed for angina, may repeat q5mins for up three doses 25 tablet 6   • primidone (MYSOLINE) 50 MG tablet Take 25 mg by mouth Every Night.  1   • raNITIdine (ZANTAC) 300 MG tablet Take 300 mg by mouth Every Night.  6   • silodosin (RAPAFLO) 8 MG capsule capsule Take 8 mg by mouth Daily.  0   • DEXILANT 60 MG capsule Take 60 mg by mouth Daily.     • diphenoxylate-atropine (LOMOTIL) 2.5-0.025 MG per tablet TAKE 1 TABLET BY MOUTH 3-4 TIMES DAILY AS NEEDED  1   • Lancets (ONETOUCH DELICA PLUS [id number]) misc 2 (Two) Times a Day. for testing  5   • ONETOUCH VERIO test strip TEST TWCIE DAILY  5   • potassium chloride (K-DUR) 10 MEQ CR tablet Take 10 mEq by mouth 2 (Two) Times a Day.  10   • terazosin (HYTRIN) 2 MG capsule Take 2 mg by mouth Daily.  0       Social History      Socioeconomic History   • Marital status:      Spouse name: Not on file   • Number of children: Not on file   • Years of education: Not on file   • Highest education level: Not on file   Tobacco Use   • Smoking status: Former Smoker     Last attempt to quit: 2005     Years since quittin.9   • Smokeless tobacco: Never Used   Substance and Sexual Activity   • Alcohol use: No   • Drug use: No   • Sexual activity: Defer       Family History   Problem Relation Age of Onset   • Heart disease Father         REVIEW OF SYSTEMS:   All ROS was performed and is Negative except as outlined in HPI     Objective:     Vitals:    19 1250   BP: 130/81   BP Location: Right arm   Patient Position: Lying   Pulse: 76   Resp: 20   Temp: 97.8 °F (36.6 °C)   TempSrc: Oral   SpO2: 94%   Weight: 110 kg (241 lb 6.5 oz)     Body mass index is 45.64 kg/m².  Flowsheet Rows      First Filed Value   Admission Height  --   Admission Weight  110 kg (241 lb 6.5 oz) Documented at 2019 1250          Physical Exam   Physical Exam   Constitutional: She is oriented to person, place, and time. She appears well-developed and well-nourished. No distress.   HENT:   Head: Normocephalic.   Eyes: Conjunctivae are normal.   Neck: Normal range of motion. No JVD present.   Cardiovascular: Normal rate, regular rhythm, normal heart sounds and intact distal pulses.   No murmur heard.  Pulses:       Carotid pulses are 2+ on the right side, and 2+ on the left side.       Radial pulses are 2+ on the right side, and 2+ on the left side.        Posterior tibial pulses are 2+ on the right side, and 2+ on the left side.   Pulmonary/Chest: Effort normal. No respiratory distress. She has wheezes. She has no rhonchi. She has rales. She exhibits no tenderness.   Abdominal: Soft. Bowel sounds are normal. She exhibits no distension.   Musculoskeletal: Normal range of motion. She exhibits edema.   Neurological: She is alert and oriented to person, place,  and time.   Skin: Skin is warm, dry and intact. No rash noted. She is not diaphoretic. No cyanosis.   Psychiatric: She has a normal mood and affect. Her behavior is normal. Judgment and thought content normal.       Lab Review:     I personally viewed and interpreted the patient's EKG/Telemetry data-normal sinus rhythm     Assessment/ Plan   1.  74-year-old female with acute on chronic diastolic congestive heart failure.  She had a recent admission for heart failure at [facility] but I do not have those records available.  Last echocardiogram 5/2017 showed normal left ventricular systolic function however will repeat echocardiogram during this admission.  We will initiate IV diuretic and due to history of kidney disease have nephrology evaluate and give further diuretic management  2.  Hx of Coronary artery disease with prior myocardial infarction in 2014 2004 but total of 3 stents placed.  She has no angina. Will check ECG for chest heaviness complaint   3.  Severe COPD on 3 L of oxygen at home.  4.  Diabetes mellitus-we will hold Januvia due to side effect of heart failure and initiate sliding scale and follow blood glucose  5.  Morbid obesity impacting all aspects of care  6. History of hypertension follow blood pressure  7.  Carotid artery disease with left carotid artery stenosis noted to be 80-99% in 2015 previously was felt that the risk of carotid endarterectomy was too great given her other comorbidities.  We will need to clarify if she had endarterectomy for this and consider outpatient carotid duplex to follow up  8.  Hyperlipidemia continue atorvastatin 10 mg      We will obtain lab work, ECG, chest x-ray for abnormal respiratory exam, start IV diuretic with furosemide 40 mg 3 times daily await nephrology input, repeat echocardiogram, follow blood pressure, daily weights, start low-sodium diet and follow blood glucose follow-up in the morning    Addendum-echocardiogram completed and  resulted normal left ventricular systolic function, RV cavity dilated with normal function, mild MR, mild TR, mildly elevated RVSP.  Chest x-ray showed pulmonary vascular congestion.  Lab work is not back yet.

## 2019-12-20 NOTE — CONSULTS
Referring Provider: Dr. Ayush Dougherty  Reason for Consultation: CKD and edema    Subjective     Chief complaint No chief complaint on file.      History of present illness:  73 yo WF aware of CKD for several years, admitted today for further eval of chest pressure.  She recalls having seen a nephrologist in [place], [place], in the past; she believes that her baseline SCR is 1.5 mg/dL.  I have no recent laboratories for review, with most recent creatinine 1.2 from 2015.  PMH outlined below; [facility]  She reports losing 20 pounds of water weight while there after furosemide was doubled from 20 mg daily to 40 mg daily.  She had gained easily 30 pounds over the last 1 year.  · She has had no further chest pain since her arrival to the hospital  · No urinary complaints; BMs unremarkable  · Leg swelling is much improved from what it was several weeks ago  · She describes chronic orthopnea for the past 1 year; has YAN; no shortness of breath at rest though requires 3 L/min continuously  · Appetite is fair; no nausea or vomiting    Past Medical History:   Diagnosis Date   • Breast cancer (CMS/HCC)    • Carotid artery disease (CMS/HCC)    • COPD (chronic obstructive pulmonary disease) (CMS/[facility])    • Coronary artery disease     MI in  and  - status post 3 stents by Dr. Watt    • Hyperlipidemia    • Hypertension    • Obesity    • Orthostatic hypotension    • Vasovagal syncope      Past Surgical History:   Procedure Laterality Date   • BREAST LUMPECTOMY Right    • CAROTID STENT       Family History   Problem Relation Age of Onset   • Heart disease Father      Social History     Tobacco Use   • Smoking status: Former Smoker     Last attempt to quit: 2005     Years since quittin.9   • Smokeless tobacco: Never Used   Substance Use Topics   • Alcohol use: No   • Drug use: No     Medications Prior to Admission   Medication Sig Dispense  Refill Last Dose   • albuterol (PROVENTIL) (2.5 MG/3ML) 0.083% nebulizer solution USE 1 VIAL VIA NEBULIZER EVERY 4 HOURS  5 12/20/2019 at Unknown time   • aspirin 81 MG tablet Take 81 mg by mouth daily.     12/20/2019 at Unknown time   • atorvastatin (LIPITOR) 10 MG tablet Take 10 mg by mouth daily.     12/19/2019 at Unknown time   • BREO ELLIPTA 100-25 MCG/INH aerosol powder  1 EACH IH DAILY  3 12/20/2019 at Unknown time   • clonazePAM (KlonoPIN) 0.5 MG tablet Take 0.5 mg by mouth 2 (Two) Times a Day.  1 12/20/2019 at Unknown time   • estradiol (ESTRACE) 0.1 MG/GM vaginal cream Insert 2 g into the vagina 2 (Two) Times a Week. Sunday and Thursday   Past Week at Unknown time   • furosemide (LASIX) 20 MG tablet Take 20 mg by mouth Daily.  0 12/20/2019 at Unknown time   • JANUVIA 100 MG tablet Take 100 mg by mouth Daily.  5 12/20/2019 at Unknown time   • metoclopramide (REGLAN) 5 MG tablet Take 5 mg by mouth 4 (Four) Times a Day Before Meals & at Bedtime.   12/20/2019 at Unknown time   • metoprolol tartrate (LOPRESSOR) 25 MG tablet TAKE 1 TABLET BY MOUTH TWICE A DAY 60 tablet 2 12/20/2019 at Unknown time   • MYRBETRIQ 50 MG tablet sustained-release 24 hour 24 hr tablet Take 50 mg by mouth Daily.  3 12/19/2019 at Unknown time   • nitroglycerin (NITROSTAT) 0.4 MG SL tablet 1 under the tongue as needed for angina, may repeat q5mins for up three doses 25 tablet 6 12/20/2019 at Unknown time   • primidone (MYSOLINE) 50 MG tablet Take 25 mg by mouth Every Night.  1 12/19/2019 at Unknown time   • raNITIdine (ZANTAC) 300 MG tablet Take 300 mg by mouth Every Night.  6 12/19/2019 at Unknown time   • silodosin (RAPAFLO) 8 MG capsule capsule Take 8 mg by mouth Daily.  0 12/20/2019 at Unknown time   • DEXILANT 60 MG capsule Take 60 mg by mouth Daily.   Taking   • potassium chloride (K-DUR) 10 MEQ CR tablet Take 10 mEq by mouth 2 (Two) Times a Day.  10 Taking     Allergies:  Citalopram; Alprazolam; Azithromycin; Bactrim  "[sulfamethoxazole-trimethoprim]; Cefuroxime; Cephalexin; Ciprofloxacin; Clopidogrel; Doxycycline; Flomax [tamsulosin hcl]; Gabapentin; Latex; Oxybutynin; Sulfamethoxazole; Trimethoprim; and Zyrtec [cetirizine]    Review of Systems  14-point ROS performed and all negative except for pertinent +/-'s detailed in HPI.     Objective     Vital Signs  Temp:  [97.8 °F (36.6 °C)] 97.8 °F (36.6 °C)  Heart Rate:  [70-76] 70  Resp:  [20] 20  BP: (114-130)/(68-81) 114/68    Flowsheet Rows      First Filed Value   Admission Height  154.9 cm (61\") Documented at 12/20/2019 1440   Admission Weight  110 kg (241 lb 6.5 oz) Documented at 12/20/2019 1250           No intake/output data recorded.  No intake/output data recorded.  No intake or output data in the 24 hours ending 12/20/19 1720    Physical Exam:  NAD; pleasant; oriented; looks stated age  Morbidly obese  MMM; AT/NC   No eye discharge; no scleral icterus  No JVD; faint bilateral carotid bruits  Crackles in bases bilat; a few soft expiratory wheezes; not labored 3 L/min  RRR, no rub  Soft, NT, ND, BS+  +1-2 doughy edema standing to the hips  + clubbing  No asterixis; fine tremor present  Moves all extremities  Mood and affect are normal  Speech is fluent    Results Review:        Invalid input(s): LABALBU, PROT    CrCl cannot be calculated (Patient's most recent lab result is older than the maximum 30 days allowed.).                      Active Medications    aspirin 81 mg Oral Daily   atorvastatin 10 mg Oral Daily   budesonide-formoterol 2 puff Inhalation Daily   famotidine 40 mg Oral Daily   furosemide 40 mg Intravenous Q8H   insulin lispro 0-7 Units Subcutaneous 4x Daily With Meals & Nightly   metoclopramide 5 mg Oral 4x Daily AC & at Bedtime   metoprolol tartrate 25 mg Oral BID   potassium chloride 10 mEq Oral BID With Meals   primidone 25 mg Oral Nightly   terazosin 2 mg Oral Daily          Assessment/Plan   Assessment  1.  CKD: still awaiting results of labs drawn a " short while ago.  By her description, she has CKD3.  Her exam notable for volume excess peripherally and centrally, though apparently this is much improved from just 2 weeks ago (when hospitalized and had a 20-pound diuresis).  No urine studies yet  2.  Chest pressure and known CAD  3.  COPD on continuous O2 3 L/min  4.  Obesity  5.  Hypertension  6.  DM2: She believes she has had diabetes only 1-2 years      * No active hospital problems. *      Plan  1.  Agree with furosemide 40 mg IV TID  2.  Await chemistries drawn a short while ago  3.  UA, FENa, and Uprot:cr  4.  Will screen for paraproteinemia if anemia present  5.  Will contact outside hospital for prior imaging, such as a renal ultrasound    I discussed the patient's findings and my recommendations with patient and family member at bedside    Bret Gardiner MD  12/20/19  5:20 PM

## 2019-12-21 PROBLEM — I50.9 ACUTE CHF (CONGESTIVE HEART FAILURE) (HCC): Status: ACTIVE | Noted: 2019-12-21

## 2019-12-21 LAB
ALBUMIN SERPL-MCNC: 3.5 G/DL (ref 3.5–5.2)
ALBUMIN/GLOB SERPL: 1.1 G/DL
ALP SERPL-CCNC: 102 U/L (ref 39–117)
ALT SERPL W P-5'-P-CCNC: 20 U/L (ref 1–33)
ANION GAP SERPL CALCULATED.3IONS-SCNC: 13 MMOL/L (ref 5–15)
AST SERPL-CCNC: 21 U/L (ref 1–32)
BILIRUB SERPL-MCNC: 0.5 MG/DL (ref 0.2–1.2)
BUN BLD-MCNC: 26 MG/DL (ref 8–23)
BUN/CREAT SERPL: 17.4 (ref 7–25)
CALCIUM SPEC-SCNC: 8.9 MG/DL (ref 8.6–10.5)
CHLORIDE SERPL-SCNC: 97 MMOL/L (ref 98–107)
CO2 SERPL-SCNC: 30 MMOL/L (ref 22–29)
CREAT BLD-MCNC: 1.49 MG/DL (ref 0.57–1)
DEPRECATED RDW RBC AUTO: 41.5 FL (ref 37–54)
ERYTHROCYTE [DISTWIDTH] IN BLOOD BY AUTOMATED COUNT: 13.2 % (ref 12.3–15.4)
GFR SERPL CREATININE-BSD FRML MDRD: 34 ML/MIN/1.73
GLOBULIN UR ELPH-MCNC: 3.2 GM/DL
GLUCOSE BLD-MCNC: 139 MG/DL (ref 65–99)
GLUCOSE BLDC GLUCOMTR-MCNC: 114 MG/DL (ref 70–130)
GLUCOSE BLDC GLUCOMTR-MCNC: 122 MG/DL (ref 70–130)
GLUCOSE BLDC GLUCOMTR-MCNC: 137 MG/DL (ref 70–130)
GLUCOSE BLDC GLUCOMTR-MCNC: 156 MG/DL (ref 70–130)
HCT VFR BLD AUTO: 34.1 % (ref 34–46.6)
HGB BLD-MCNC: 11.6 G/DL (ref 12–15.9)
MAGNESIUM SERPL-MCNC: 1.8 MG/DL (ref 1.6–2.4)
MCH RBC QN AUTO: 30 PG (ref 26.6–33)
MCHC RBC AUTO-ENTMCNC: 34 G/DL (ref 31.5–35.7)
MCV RBC AUTO: 88.1 FL (ref 79–97)
PHOSPHATE SERPL-MCNC: 3.7 MG/DL (ref 2.5–4.5)
PLATELET # BLD AUTO: 98 10*3/MM3 (ref 140–450)
PMV BLD AUTO: 10.6 FL (ref 6–12)
POTASSIUM BLD-SCNC: 3.5 MMOL/L (ref 3.5–5.2)
PROT SERPL-MCNC: 6.7 G/DL (ref 6–8.5)
RBC # BLD AUTO: 3.87 10*6/MM3 (ref 3.77–5.28)
SODIUM BLD-SCNC: 140 MMOL/L (ref 136–145)
WBC NRBC COR # BLD: 5.21 10*3/MM3 (ref 3.4–10.8)

## 2019-12-21 PROCEDURE — 80053 COMPREHEN METABOLIC PANEL: CPT | Performed by: NURSE PRACTITIONER

## 2019-12-21 PROCEDURE — G0378 HOSPITAL OBSERVATION PER HR: HCPCS

## 2019-12-21 PROCEDURE — 25010000002 HEPARIN (PORCINE) PER 1000 UNITS: Performed by: INTERNAL MEDICINE

## 2019-12-21 PROCEDURE — 63710000001 INSULIN LISPRO (HUMAN) PER 5 UNITS: Performed by: INTERNAL MEDICINE

## 2019-12-21 PROCEDURE — 82962 GLUCOSE BLOOD TEST: CPT

## 2019-12-21 PROCEDURE — 94640 AIRWAY INHALATION TREATMENT: CPT

## 2019-12-21 PROCEDURE — 25010000002 FUROSEMIDE PER 20 MG: Performed by: NURSE PRACTITIONER

## 2019-12-21 PROCEDURE — 85027 COMPLETE CBC AUTOMATED: CPT | Performed by: NURSE PRACTITIONER

## 2019-12-21 PROCEDURE — 96372 THER/PROPH/DIAG INJ SC/IM: CPT

## 2019-12-21 PROCEDURE — 84100 ASSAY OF PHOSPHORUS: CPT | Performed by: INTERNAL MEDICINE

## 2019-12-21 PROCEDURE — 83735 ASSAY OF MAGNESIUM: CPT | Performed by: INTERNAL MEDICINE

## 2019-12-21 PROCEDURE — 25010000002 FUROSEMIDE PER 20 MG: Performed by: INTERNAL MEDICINE

## 2019-12-21 PROCEDURE — 96376 TX/PRO/DX INJ SAME DRUG ADON: CPT

## 2019-12-21 PROCEDURE — 94799 UNLISTED PULMONARY SVC/PX: CPT

## 2019-12-21 PROCEDURE — 99233 SBSQ HOSP IP/OBS HIGH 50: CPT | Performed by: INTERNAL MEDICINE

## 2019-12-21 RX ORDER — FUROSEMIDE 10 MG/ML
40 INJECTION INTRAMUSCULAR; INTRAVENOUS EVERY 12 HOURS
Status: DISCONTINUED | OUTPATIENT
Start: 2019-12-21 | End: 2019-12-22

## 2019-12-21 RX ORDER — HEPARIN SODIUM 5000 [USP'U]/ML
5000 INJECTION, SOLUTION INTRAVENOUS; SUBCUTANEOUS EVERY 8 HOURS SCHEDULED
Status: DISCONTINUED | OUTPATIENT
Start: 2019-12-21 | End: 2019-12-23 | Stop reason: HOSPADM

## 2019-12-21 RX ORDER — ACETAMINOPHEN 325 MG/1
650 TABLET ORAL EVERY 6 HOURS PRN
Status: DISCONTINUED | OUTPATIENT
Start: 2019-12-21 | End: 2019-12-23 | Stop reason: HOSPADM

## 2019-12-21 RX ORDER — FAMOTIDINE 20 MG/1
20 TABLET, FILM COATED ORAL DAILY
Status: DISCONTINUED | OUTPATIENT
Start: 2019-12-22 | End: 2019-12-23 | Stop reason: HOSPADM

## 2019-12-21 RX ADMIN — HEPARIN SODIUM 5000 UNITS: 5000 INJECTION INTRAVENOUS; SUBCUTANEOUS at 23:08

## 2019-12-21 RX ADMIN — ASPIRIN 81 MG: 81 TABLET, COATED ORAL at 22:31

## 2019-12-21 RX ADMIN — ATORVASTATIN CALCIUM 10 MG: 10 TABLET, FILM COATED ORAL at 20:45

## 2019-12-21 RX ADMIN — METOPROLOL TARTRATE 25 MG: 25 TABLET ORAL at 20:45

## 2019-12-21 RX ADMIN — FAMOTIDINE 40 MG: 40 TABLET, FILM COATED ORAL at 09:04

## 2019-12-21 RX ADMIN — INSULIN LISPRO 2 UNITS: 100 INJECTION, SOLUTION INTRAVENOUS; SUBCUTANEOUS at 11:58

## 2019-12-21 RX ADMIN — METOCLOPRAMIDE 5 MG: 5 TABLET ORAL at 20:46

## 2019-12-21 RX ADMIN — FUROSEMIDE 40 MG: 40 INJECTION, SOLUTION INTRAMUSCULAR; INTRAVENOUS at 05:30

## 2019-12-21 RX ADMIN — POTASSIUM CHLORIDE 10 MEQ: 750 CAPSULE, EXTENDED RELEASE ORAL at 09:04

## 2019-12-21 RX ADMIN — TERAZOSIN HYDROCHLORIDE ANHYDROUS 2 MG: 2 CAPSULE ORAL at 09:06

## 2019-12-21 RX ADMIN — ACETAMINOPHEN 650 MG: 325 TABLET, FILM COATED ORAL at 23:08

## 2019-12-21 RX ADMIN — POTASSIUM CHLORIDE 10 MEQ: 750 CAPSULE, EXTENDED RELEASE ORAL at 17:17

## 2019-12-21 RX ADMIN — FUROSEMIDE 40 MG: 40 INJECTION, SOLUTION INTRAMUSCULAR; INTRAVENOUS at 18:41

## 2019-12-21 RX ADMIN — HEPARIN SODIUM 5000 UNITS: 5000 INJECTION INTRAVENOUS; SUBCUTANEOUS at 17:17

## 2019-12-21 RX ADMIN — BUDESONIDE AND FORMOTEROL FUMARATE DIHYDRATE 2 PUFF: 80; 4.5 AEROSOL RESPIRATORY (INHALATION) at 09:30

## 2019-12-21 RX ADMIN — METOCLOPRAMIDE 5 MG: 5 TABLET ORAL at 11:58

## 2019-12-21 RX ADMIN — METOCLOPRAMIDE 5 MG: 5 TABLET ORAL at 05:31

## 2019-12-21 RX ADMIN — PRIMIDONE 25 MG: 50 TABLET ORAL at 20:45

## 2019-12-21 RX ADMIN — METOCLOPRAMIDE 5 MG: 5 TABLET ORAL at 17:17

## 2019-12-21 RX ADMIN — METOPROLOL TARTRATE 25 MG: 25 TABLET ORAL at 09:04

## 2019-12-21 NOTE — PLAN OF CARE
Problem: Patient Care Overview  Goal: Plan of Care Review  Outcome: Ongoing (interventions implemented as appropriate)  Flowsheets (Taken 12/21/2019 1820)  Progress: improving  Plan of Care Reviewed With: patient  Outcome Summary: VSS. Telemetry monitor, SR. Up with assist x1, weak. PT consulted. Oxygen at 3 lpm via nc. Will continue to monitor.     Problem: Cardiac: Heart Failure (Adult)  Goal: Signs and Symptoms of Listed Potential Problems Will be Absent, Minimized or Managed (Cardiac: Heart Failure)  Outcome: Ongoing (interventions implemented as appropriate)     Problem: Fall Risk (Adult)  Goal: Identify Related Risk Factors and Signs and Symptoms  Outcome: Ongoing (interventions implemented as appropriate)  Goal: Absence of Fall  Outcome: Ongoing (interventions implemented as appropriate)

## 2019-12-21 NOTE — PLAN OF CARE
Problem: Patient Care Overview  Goal: Plan of Care Review  Outcome: Ongoing (interventions implemented as appropriate)  Flowsheets  Taken 12/21/2019 0343  Progress: improving  Outcome Summary: A/O x4, no c/o pain, no distress noted, slept good, spouse at  bedside, diuresis well with Lasix IV, VSS, will monitor closely  Taken 12/20/2019 1952  Plan of Care Reviewed With: patient;spouse     Problem: Cardiac: Heart Failure (Adult)  Goal: Signs and Symptoms of Listed Potential Problems Will be Absent, Minimized or Managed (Cardiac: Heart Failure)  Outcome: Ongoing (interventions implemented as appropriate)  Flowsheets (Taken 12/21/2019 0343)  Problems Assessed (Heart Failure): all  Problems Present (Heart Failure): respiratory compromise; decreased quality of life; cardiac pump dysfunction; situational response     Problem: Fall Risk (Adult)  Goal: Identify Related Risk Factors and Signs and Symptoms  Outcome: Ongoing (interventions implemented as appropriate)  Flowsheets (Taken 12/21/2019 0343)  Related Risk Factors (Fall Risk): age-related changes; gait/mobility problems; fatigue/slow reaction; environment unfamiliar     Problem: Fall Risk (Adult)  Goal: Absence of Fall  Outcome: Ongoing (interventions implemented as appropriate)  Flowsheets (Taken 12/21/2019 0343)  Absence of Fall: making progress toward outcome

## 2019-12-21 NOTE — PROGRESS NOTES
"CC: Acute congestive heart failure    Interval History: Patient reports improved shortness of breath and chest heaviness.  Less extremity swelling.  Otherwise no significant acute events overnight.      Vital Signs  Temp:  [97.6 °F (36.4 °C)-97.9 °F (36.6 °C)] 97.6 °F (36.4 °C)  Heart Rate:  [66-76] 74  Resp:  [18-20] 20  BP: (114-149)/(54-81) 149/72    Intake/Output Summary (Last 24 hours) at 12/21/2019 0940  Last data filed at 12/21/2019 0908  Gross per 24 hour   Intake 240 ml   Output 1640 ml   Net -1400 ml     Flowsheet Rows      First Filed Value   Admission Height  154.9 cm (61\") Documented at 12/20/2019 1440   Admission Weight  110 kg (241 lb 6.5 oz) Documented at 12/20/2019 1250          PHYSICAL EXAM:  General: Elderly lady who is morbidly obese no acute distress  Resp:NL Rate, symmetric chest expansion,unlabored, bilateral posterior basal crackles CV:NL rate and rhythm, NL PMI, NL S1 and S2, no Murmur, no gallop, no rub, No JVD.   ABD:Nl sounds, no masses or tenderness, nondistended, no guarding or rebound  Neuro: alert,cooperative and oriented  Extr:Normal pedal pulses, bilateral leg and ankle edema, moves all extremities but is decreased power      Results Review:    Results from last 7 days   Lab Units 12/21/19  0522   SODIUM mmol/L 140   POTASSIUM mmol/L 3.5   CHLORIDE mmol/L 97*   CO2 mmol/L 30.0*   BUN mg/dL 26*   CREATININE mg/dL 1.49*   GLUCOSE mg/dL 139*   CALCIUM mg/dL 8.9     Results from last 7 days   Lab Units 12/20/19  1648   TROPONIN T ng/mL <0.010     Results from last 7 days   Lab Units 12/21/19  0522   WBC 10*3/mm3 5.21   HEMOGLOBIN g/dL 11.6*   HEMATOCRIT % 34.1   PLATELETS 10*3/mm3 98*             Results from last 7 days   Lab Units 12/21/19  0522   MAGNESIUM mg/dL 1.8         I reviewed the patient's new clinical results.  I personally viewed and interpreted the patient's EKG/Telemetry data, labs reviewed         Medication Review:   Meds reviewed         Assessment/Plan    1.  Acute " on chronic diastolic congestive heart failure.  -Echocardiogram with grade 2 diastolic dysfunction with elevated left atrial filling pressure.  -Some improvement with IV diuresis  -Continue cautious diuresis but with decreased frequency  -May switch to oral Lasix tomorrow and DC if she is feeling better    2.  History of coronary artery disease status post prior stents  -stable with negative troponin  -Patient reports and normal pharmacologic stress test few weeks ago  -Get medical records from [facility] in [place]    3. CKD stage 3   -Mild increase in creatinine  -Nephrology following    4.  Type 2 diabetes mellitus  5.  Severe left internal carotid artery stenosis    6.  Hyperlipidemia  -continue statin     7.  Morbidly obese  -would probably complicate care  -Ambulate patient with PT      Hemant Jo MD  12/21/19  9:40 AM

## 2019-12-22 LAB
ANION GAP SERPL CALCULATED.3IONS-SCNC: 15.4 MMOL/L (ref 5–15)
BUN BLD-MCNC: 24 MG/DL (ref 8–23)
BUN/CREAT SERPL: 15.5 (ref 7–25)
CALCIUM SPEC-SCNC: 8.6 MG/DL (ref 8.6–10.5)
CHLORIDE SERPL-SCNC: 95 MMOL/L (ref 98–107)
CO2 SERPL-SCNC: 29.6 MMOL/L (ref 22–29)
CREAT BLD-MCNC: 1.55 MG/DL (ref 0.57–1)
GFR SERPL CREATININE-BSD FRML MDRD: 33 ML/MIN/1.73
GLUCOSE BLD-MCNC: 176 MG/DL (ref 65–99)
GLUCOSE BLDC GLUCOMTR-MCNC: 122 MG/DL (ref 70–130)
GLUCOSE BLDC GLUCOMTR-MCNC: 129 MG/DL (ref 70–130)
GLUCOSE BLDC GLUCOMTR-MCNC: 195 MG/DL (ref 70–130)
GLUCOSE BLDC GLUCOMTR-MCNC: 201 MG/DL (ref 70–130)
MAGNESIUM SERPL-MCNC: 1.9 MG/DL (ref 1.6–2.4)
POTASSIUM BLD-SCNC: 3.5 MMOL/L (ref 3.5–5.2)
SODIUM BLD-SCNC: 140 MMOL/L (ref 136–145)

## 2019-12-22 PROCEDURE — 94799 UNLISTED PULMONARY SVC/PX: CPT

## 2019-12-22 PROCEDURE — 63710000001 INSULIN LISPRO (HUMAN) PER 5 UNITS: Performed by: INTERNAL MEDICINE

## 2019-12-22 PROCEDURE — 80048 BASIC METABOLIC PNL TOTAL CA: CPT | Performed by: INTERNAL MEDICINE

## 2019-12-22 PROCEDURE — 97110 THERAPEUTIC EXERCISES: CPT | Performed by: PHYSICAL THERAPIST

## 2019-12-22 PROCEDURE — 99233 SBSQ HOSP IP/OBS HIGH 50: CPT | Performed by: INTERNAL MEDICINE

## 2019-12-22 PROCEDURE — G0378 HOSPITAL OBSERVATION PER HR: HCPCS

## 2019-12-22 PROCEDURE — 25010000002 FUROSEMIDE PER 20 MG: Performed by: INTERNAL MEDICINE

## 2019-12-22 PROCEDURE — 25010000002 HEPARIN (PORCINE) PER 1000 UNITS: Performed by: INTERNAL MEDICINE

## 2019-12-22 PROCEDURE — 96376 TX/PRO/DX INJ SAME DRUG ADON: CPT

## 2019-12-22 PROCEDURE — 82962 GLUCOSE BLOOD TEST: CPT

## 2019-12-22 PROCEDURE — 97161 PT EVAL LOW COMPLEX 20 MIN: CPT | Performed by: PHYSICAL THERAPIST

## 2019-12-22 PROCEDURE — 96372 THER/PROPH/DIAG INJ SC/IM: CPT

## 2019-12-22 PROCEDURE — 83735 ASSAY OF MAGNESIUM: CPT | Performed by: INTERNAL MEDICINE

## 2019-12-22 RX ORDER — FUROSEMIDE 80 MG
80 TABLET ORAL ONCE
Status: COMPLETED | OUTPATIENT
Start: 2019-12-22 | End: 2019-12-22

## 2019-12-22 RX ORDER — FUROSEMIDE 80 MG
80 TABLET ORAL DAILY
Status: DISCONTINUED | OUTPATIENT
Start: 2019-12-23 | End: 2019-12-23 | Stop reason: HOSPADM

## 2019-12-22 RX ORDER — POTASSIUM CHLORIDE 750 MG/1
40 CAPSULE, EXTENDED RELEASE ORAL ONCE
Status: COMPLETED | OUTPATIENT
Start: 2019-12-22 | End: 2019-12-22

## 2019-12-22 RX ORDER — METOPROLOL TARTRATE 50 MG/1
50 TABLET, FILM COATED ORAL 2 TIMES DAILY
Status: DISCONTINUED | OUTPATIENT
Start: 2019-12-22 | End: 2019-12-23 | Stop reason: HOSPADM

## 2019-12-22 RX ADMIN — BUDESONIDE AND FORMOTEROL FUMARATE DIHYDRATE 2 PUFF: 80; 4.5 AEROSOL RESPIRATORY (INHALATION) at 09:06

## 2019-12-22 RX ADMIN — ATORVASTATIN CALCIUM 10 MG: 10 TABLET, FILM COATED ORAL at 20:39

## 2019-12-22 RX ADMIN — FUROSEMIDE 80 MG: 80 TABLET ORAL at 13:57

## 2019-12-22 RX ADMIN — METOCLOPRAMIDE 5 MG: 5 TABLET ORAL at 06:39

## 2019-12-22 RX ADMIN — METOPROLOL TARTRATE 25 MG: 25 TABLET ORAL at 08:45

## 2019-12-22 RX ADMIN — METOCLOPRAMIDE 5 MG: 5 TABLET ORAL at 20:39

## 2019-12-22 RX ADMIN — INSULIN LISPRO 2 UNITS: 100 INJECTION, SOLUTION INTRAVENOUS; SUBCUTANEOUS at 08:45

## 2019-12-22 RX ADMIN — POTASSIUM CHLORIDE 10 MEQ: 750 CAPSULE, EXTENDED RELEASE ORAL at 17:30

## 2019-12-22 RX ADMIN — PRIMIDONE 25 MG: 50 TABLET ORAL at 20:39

## 2019-12-22 RX ADMIN — INSULIN LISPRO 3 UNITS: 100 INJECTION, SOLUTION INTRAVENOUS; SUBCUTANEOUS at 11:43

## 2019-12-22 RX ADMIN — METOPROLOL TARTRATE 50 MG: 50 TABLET, FILM COATED ORAL at 20:39

## 2019-12-22 RX ADMIN — FAMOTIDINE 20 MG: 20 TABLET, FILM COATED ORAL at 08:45

## 2019-12-22 RX ADMIN — ASPIRIN 81 MG: 81 TABLET, COATED ORAL at 08:45

## 2019-12-22 RX ADMIN — HEPARIN SODIUM 5000 UNITS: 5000 INJECTION INTRAVENOUS; SUBCUTANEOUS at 06:39

## 2019-12-22 RX ADMIN — TERAZOSIN HYDROCHLORIDE ANHYDROUS 2 MG: 2 CAPSULE ORAL at 08:45

## 2019-12-22 RX ADMIN — POTASSIUM CHLORIDE 40 MEQ: 750 CAPSULE, EXTENDED RELEASE ORAL at 11:43

## 2019-12-22 RX ADMIN — POTASSIUM CHLORIDE 10 MEQ: 750 CAPSULE, EXTENDED RELEASE ORAL at 08:45

## 2019-12-22 RX ADMIN — FUROSEMIDE 40 MG: 40 INJECTION, SOLUTION INTRAMUSCULAR; INTRAVENOUS at 06:39

## 2019-12-22 NOTE — PROGRESS NOTES
NEPHROLOGY PROGRESS NOTE    PATIENT IDENTIFICATION:   Name:  Rachana Membreno      MRN:  [id number]     74 y.o.  female             Reason for visit: likely CKD3    SUBJECTIVE:   Breathing is comfortable on supplemental O2; appetite fine; eager to go home    OBJECTIVE:  Vitals:    12/22/19 0316 12/22/19 0640 12/22/19 0906 12/22/19 0908   BP:  160/79     BP Location:  Right arm     Patient Position:  Lying     Pulse:   87    Resp:   20 20   Temp:       TempSrc:       SpO2:   96%    Weight: 109 kg (240 lb 3.2 oz)      Height:               Body mass index is 45.39 kg/m².    Intake/Output Summary (Last 24 hours) at 12/22/2019 1131  Last data filed at 12/22/2019 0908  Gross per 24 hour   Intake 980 ml   Output 1100 ml   Net -120 ml     Wt Readings from Last 1 Encounters:   12/22/19 0316 109 kg (240 lb 3.2 oz)   12/21/19 0529 110 kg (241 lb 8 oz)   12/20/19 1440 109 kg (241 lb)   12/20/19 1250 110 kg (241 lb 6.5 oz)     Wt Readings from Last 3 Encounters:   12/22/19 109 kg (240 lb 3.2 oz)   09/11/19 113 kg (250 lb)   03/07/19 108 kg (237 lb)         Exam:  NAD; pleasant; oriented; looks stated age  Morbidly obese  MMM; AT/NC   No eye discharge; no scleral icterus  No JVD; faint bilateral carotid bruits  Crackles in bases bilat; no wheezes; not labored 3 L/min  RRR, no rub  Soft, NT, ND, BS+  +1-2 doughy edema extending to the hips  + clubbing  No asterixis; fine tremor present  Moves all extremities  Mood and affect are normal  Speech is fluent    Scheduled meds:      aspirin 81 mg Oral Daily   atorvastatin 10 mg Oral Daily   budesonide-formoterol 2 puff Inhalation Daily   famotidine 20 mg Oral Daily   furosemide 40 mg Intravenous Q12H   heparin (porcine) 5,000 Units Subcutaneous Q8H   insulin lispro 0-7 Units Subcutaneous 4x Daily With Meals & Nightly   metoclopramide 5 mg Oral 4x Daily AC & at Bedtime   metoprolol tartrate 25 mg Oral BID   potassium chloride 10 mEq Oral BID With Meals   primidone 25 mg Oral  Nightly   terazosin 2 mg Oral Daily     IV meds:                           Data Review:    Results from last 7 days   Lab Units 12/22/19  0429 12/21/19  0522 12/20/19  1648   SODIUM mmol/L 140 140 142   POTASSIUM mmol/L 3.5 3.5 3.7   CHLORIDE mmol/L 95* 97* 99   CO2 mmol/L 29.6* 30.0* 29.7*   BUN mg/dL 24* 26* 25*   CREATININE mg/dL 1.55* 1.49* 1.40*   CALCIUM mg/dL 8.6 8.9 9.5   BILIRUBIN mg/dL  --  0.5 0.5   ALK PHOS U/L  --  102 107   ALT (SGPT) U/L  --  20 20   AST (SGOT) U/L  --  21 23   GLUCOSE mg/dL 176* 139* 128*     Estimated Creatinine Clearance: 36.9 mL/min (A) (by C-G formula based on SCr of 1.55 mg/dL (H)).  Results from last 7 days   Lab Units 12/20/19  1846   SODIUM UR mmol/L 62   CREATININE UR mg/dL 138.6   PROTEIN TOTAL URINE mg/dL 15.0     Results from last 7 days   Lab Units 12/22/19  0429 12/21/19  0522   MAGNESIUM mg/dL 1.9 1.8   PHOSPHORUS mg/dL  --  3.7       Results from last 7 days   Lab Units 12/21/19  0522   WBC 10*3/mm3 5.21   HEMOGLOBIN g/dL 11.6*   PLATELETS 10*3/mm3 98*                   ASSESSMENT:     Acute CHF (congestive heart failure) (CMS/[facility])    1.  CKD3:  Stable. Volume excess peripherally and centrally, though apparently this is much improved from just 2 weeks ago (when hospitalized and had a 20-pound diuresis).   Negative fluid balance with furosemide; low potassium.  [place] urinalysis with no blood and no protein  2.  Chest pressure and known CAD  3.  COPD on continuous O2 3 L/min  4.  Chronic diastolic CHF  5.  Hypertension  6.  DM2: She believes she has had diabetes only 1-2 years        PLAN:  1.  Oral furosemide 40 mg once daily (her home dose had just been 20 mg once daily)  2.  Scheduled potassium 10 mEq BID  3.  Home anytime from renal view    Bret Gardiner MD  12/22/2019    11:31 AM

## 2019-12-22 NOTE — PROGRESS NOTES
Discharge Planning Assessment  [facility]     Patient Name: Rachana Membreno  MRN: [id number]  Today's Date: 12/22/2019    Admit Date: 12/20/2019    Discharge Needs Assessment     Row Name 12/22/19 1849       Living Environment    Lives With  spouse    Current Living Arrangements  home/apartment/condo    Primary Care Provided by  spouse/significant other    Provides Primary Care For  no one, unable/limited ability to care for self    Family Caregiver if Needed  spouse    Family Caregiver Names  Syd Membreno [phone]    Quality of Family Relationships  helpful;involved;supportive    Able to Return to Prior Arrangements  yes       Resource/Environmental Concerns    Resource/Environmental Concerns  none    Transportation Concerns  car, none       Transition Planning    Patient/Family Anticipates Transition to  home with family    Patient/Family Anticipated Services at Transition  none    Transportation Anticipated  family or friend will provide       Discharge Needs Assessment    Readmission Within the Last 30 Days  no previous admission in last 30 days    Concerns to be Addressed  denies needs/concerns at this time    Equipment Currently Used at Home  wheelchair;rollator;oxygen;nebulizer;glucometer wears O2 at 3L at all times thru [employer]    Anticipated Changes Related to Illness  none    Equipment Needed After Discharge  none    Outpatient/Agency/Support Group Needs  -- denies previous use of HH. Has been to rehab at [facility] in the past.    Discharge Facility/Level of Care Needs  -- n/a    Provided post acute provider list?  Refused offered list for HH near home from Medicare.gov and pt and spouse declined    Current Discharge Risk  chronically ill;dependent with mobility/activities of daily living        Discharge Plan     Row Name 12/22/19 8882       Plan    Plan  Home with spouse to assist; no needs    Patient/Family in Agreement with Plan  yes Spoke with pt and with pt permission her  spouse at bedside    Plan Comments  Introduced self/explained role of CCP. Face sheet data reviewed. CMS VELARDE letter signed. Pt lives at home with her spouse. There is one step from to garage to enter the home. Pt no longer drives. Pt wears O2 from [employer] at 3L at all times. She takes nebulizer treatments 3 times/day and monitors her blood sugar at home. Denies previous use of HH. States she has been to inpatient rehab at [facility] in the past. Plan at this time is for pt to return home with spouse. He will be able to drive pt home. Pt hopes to DC on Monday. CCP to follow...........JW        Destination      Coordination has not been started for this encounter.      Durable Medical Equipment      Coordination has not been started for this encounter.      Dialysis/Infusion      Coordination has not been started for this encounter.      Home Medical Care      Coordination has not been started for this encounter.      Therapy      Coordination has not been started for this encounter.      Community Resources      Coordination has not been started for this encounter.          Demographic Summary     Row Name 12/22/19 7775       General Information    Admission Type  observation    Arrived From  home    Required Notices Provided  Observation Status Notice    Referral Source  admission list    Reason for Consult  discharge planning    Preferred Language  English     Used During This Interaction  no       Contact Information    Permission Granted to Share Info With  ;family/designee        Functional Status     Row Name 12/22/19 3473       Functional Status    Usual Activity Tolerance  fair    Current Activity Tolerance  poor       Functional Status, IADL    Medications  assistive person    Meal Preparation  assistive person    Housekeeping  assistive person    Laundry  assistive person    Shopping  assistive person    IADL Comments  pt no longer drives. Spouse is her primary caregiver        Mental Status    General Appearance WDL  WDL        Psychosocial    No documentation.       Abuse/Neglect    No documentation.       Legal    No documentation.       Substance Abuse    No documentation.       Patient Forms    No documentation.           Arleen Courtney RN

## 2019-12-22 NOTE — PLAN OF CARE
Problem: Patient Care Overview  Goal: Plan of Care Review  Outcome: Ongoing (interventions implemented as appropriate)  Flowsheets (Taken 12/22/2019 0613)  Progress: improving  Plan of Care Reviewed With: patient  Outcome Summary: Slept good hours, A/Ox4, some headache- Tylenol given, felt warm early morning-- no fever, given ice cream , sugar checked and was almst 200, no distress noted, will monitor     Problem: Cardiac: Heart Failure (Adult)  Goal: Signs and Symptoms of Listed Potential Problems Will be Absent, Minimized or Managed (Cardiac: Heart Failure)  Outcome: Ongoing (interventions implemented as appropriate)  Flowsheets  Taken 12/22/2019 0613  Problems Assessed (Heart Failure): cardiac pump dysfunction;decreased quality of life;functional decline/self-care deficit;respiratory compromise  Taken 12/21/2019 0343  Problems Present (Heart Failure): respiratory compromise;decreased quality of life;cardiac pump dysfunction;situational response     Problem: Fall Risk (Adult)  Goal: Identify Related Risk Factors and Signs and Symptoms  Outcome: Ongoing (interventions implemented as appropriate)  Flowsheets  Taken 12/21/2019 0343  Related Risk Factors (Fall Risk): age-related changes;gait/mobility problems;fatigue/slow reaction;environment unfamiliar  Taken 12/22/2019 0613  Signs and Symptoms (Fall Risk): presence of risk factors     Problem: Fall Risk (Adult)  Goal: Absence of Fall  Outcome: Ongoing (interventions implemented as appropriate)  Flowsheets (Taken 12/21/2019 0343)  Absence of Fall: making progress toward outcome

## 2019-12-22 NOTE — PROGRESS NOTES
"CC: Congestive heart failure    Interval History: No acute events overnight      Vital Signs  Temp:  [98 °F (36.7 °C)-98.4 °F (36.9 °C)] 98.4 °F (36.9 °C)  Heart Rate:  [60-87] 87  Resp:  [20] 20  BP: (118-160)/(69-79) 160/79    Intake/Output Summary (Last 24 hours) at 12/22/2019 1145  Last data filed at 12/22/2019 0908  Gross per 24 hour   Intake 980 ml   Output 1100 ml   Net -120 ml     Flowsheet Rows      First Filed Value   Admission Height  154.9 cm (61\") Documented at 12/20/2019 1440   Admission Weight  110 kg (241 lb 6.5 oz) Documented at 12/20/2019 1250          PHYSICAL EXAM:  General: Mild distress and patient sitting on chair on nasal cannula  Resp:NL Rate, symmetric chest expansion, posterior basal crackles CV:NL rate and rhythm, NL PMI, NL S1 and S2, no Murmur, no gallop, no rub, No JVD.   ABD:Nl sounds, no masses or tenderness, nondistended, no guarding or rebound  Neuro: alert,cooperative and oriented  Extr:Normal pedal pulses, pedal edema, moves all extremities      Results Review:    Results from last 7 days   Lab Units 12/22/19  0429   SODIUM mmol/L 140   POTASSIUM mmol/L 3.5   CHLORIDE mmol/L 95*   CO2 mmol/L 29.6*   BUN mg/dL 24*   CREATININE mg/dL 1.55*   GLUCOSE mg/dL 176*   CALCIUM mg/dL 8.6     Results from last 7 days   Lab Units 12/20/19  1648   TROPONIN T ng/mL <0.010     Results from last 7 days   Lab Units 12/21/19  0522   WBC 10*3/mm3 5.21   HEMOGLOBIN g/dL 11.6*   HEMATOCRIT % 34.1   PLATELETS 10*3/mm3 98*             Results from last 7 days   Lab Units 12/22/19  0429   MAGNESIUM mg/dL 1.9         I reviewed the patient's new clinical results.  I personally viewed and interpreted the patient's EKG/Telemetry data, labs reviewed         Medication Review:   Meds reviewed         Assessment/Plan    1.  Acute on chronic diastolic congestive heart failure.  -Echocardiogram with grade 2 diastolic dysfunction with elevated left atrial filling pressure.  -Some improvement with IV diuresis-net " negative about 120 over the last 24 hours but clinically  -Switch to oral Lasix 80 mg p.o. daily today-as she still has posterior basal crackles and some JVD and will monitor response to oral diuretic.     2.  History of coronary artery disease status post prior stents  -stable with negative troponin  -Patient reports and normal pharmacologic stress test few weeks ago  -Get medical records from [facility] in [place]     3. CKD stage 3   -Mild increase in creatinine  -Nephrology following    4.  Hypertension-episodes of high  -Increase metoprolol as she is still tachycardic  -Would benefit from ACE or amlodipine-start  once creatinine is better or stable     5.  Type 2 diabetes mellitus-fair control    6.  Severe left internal carotid artery stenosis  -Stable     7.  Hyperlipidemia  -continue statin      8.  Morbidly obese  -would probably complicate care  -Ambulate patient with PT        Appreciate input from nephrology.    Hemant Jo MD  12/22/19  11:45 AM

## 2019-12-22 NOTE — PLAN OF CARE
Problem: Patient Care Overview  Goal: Plan of Care Review  Outcome: Ongoing (interventions implemented as appropriate)  Flowsheets (Taken 12/22/2019 1600)  Plan of Care Reviewed With: patient  Note:   Pt is a 74 y.o. female admitted to [facility] with c/o acute CHF on 12/20/2019.  Pt presents today with bed mobility, transfers, and gait at baseline. No stairs to enter or traverse home. On supp. O2 3L at home. Ambulation tolerance and distance at baseline per patient and spouse. No balance deficits observed. No need for further skilled therapy services at this time. Anticipate pt will D/C to home with spousal support.

## 2019-12-22 NOTE — PROGRESS NOTES
NEPHROLOGY PROGRESS NOTE    PATIENT IDENTIFICATION:   Name:  Rachana Membreno      MRN:  [id number]     74 y.o.  female             Reason for visit: likely CKD3    SUBJECTIVE:   Feels about the same; eager to go home; breathing is fine; leg swelling same to bit better; no chest pain or pressure    OBJECTIVE:  Vitals:    12/21/19 0700 12/21/19 0930 12/21/19 1100 12/21/19 1451   BP: 149/72  133/75 153/69   BP Location: Right arm  Right arm Right arm   Patient Position: Lying  Lying Lying   Pulse: 67 74 69 66   Resp: 18 20 20 20   Temp: 97.6 °F (36.4 °C)   98.1 °F (36.7 °C)   TempSrc: Oral   Oral   SpO2: 97% 97%  98%   Weight:       Height:               Body mass index is 45.63 kg/m².    Intake/Output Summary (Last 24 hours) at 12/21/2019 1907  Last data filed at 12/21/2019 1430  Gross per 24 hour   Intake 420 ml   Output 1840 ml   Net -1420 ml     Wt Readings from Last 1 Encounters:   12/21/19 0529 110 kg (241 lb 8 oz)   12/20/19 1440 109 kg (241 lb)   12/20/19 1250 110 kg (241 lb 6.5 oz)     Wt Readings from Last 3 Encounters:   12/21/19 110 kg (241 lb 8 oz)   09/11/19 113 kg (250 lb)   03/07/19 108 kg (237 lb)         Exam:  NAD; pleasant; oriented; looks stated age  Morbidly obese  MMM; AT/NC   No eye discharge; no scleral icterus  No JVD; faint bilateral carotid bruits  Crackles in bases bilat; no wheezes; not labored 3 L/min  RRR, no rub  Soft, NT, ND, BS+  +1-2 doughy edema extending to the hips  + clubbing  No asterixis; fine tremor present  Moves all extremities  Mood and affect are normal  Speech is fluent    Scheduled meds:    aspirin 81 mg Oral Daily   atorvastatin 10 mg Oral Daily   budesonide-formoterol 2 puff Inhalation Daily   [START ON 12/22/2019] famotidine 20 mg Oral Daily   furosemide 40 mg Intravenous Q12H   heparin (porcine) 5,000 Units Subcutaneous Q8H   insulin lispro 0-7 Units Subcutaneous 4x Daily With Meals & Nightly   metoclopramide 5 mg Oral 4x Daily AC & at Bedtime    metoprolol tartrate 25 mg Oral BID   potassium chloride 10 mEq Oral BID With Meals   primidone 25 mg Oral Nightly   terazosin 2 mg Oral Daily     IV meds:                           Data Review:    Results from last 7 days   Lab Units 12/21/19  0522 12/20/19  1648   SODIUM mmol/L 140 142   POTASSIUM mmol/L 3.5 3.7   CHLORIDE mmol/L 97* 99   CO2 mmol/L 30.0* 29.7*   BUN mg/dL 26* 25*   CREATININE mg/dL 1.49* 1.40*   CALCIUM mg/dL 8.9 9.5   BILIRUBIN mg/dL 0.5 0.5   ALK PHOS U/L 102 107   ALT (SGPT) U/L 20 20   AST (SGOT) U/L 21 23   GLUCOSE mg/dL 139* 128*     Estimated Creatinine Clearance: 38.6 mL/min (A) (by C-G formula based on SCr of 1.49 mg/dL (H)).  Results from last 7 days   Lab Units 12/20/19  1846   SODIUM UR mmol/L 62   CREATININE UR mg/dL 138.6   PROTEIN TOTAL URINE mg/dL 15.0     Results from last 7 days   Lab Units 12/21/19  0522   MAGNESIUM mg/dL 1.8   PHOSPHORUS mg/dL 3.7       Results from last 7 days   Lab Units 12/21/19  0522   WBC 10*3/mm3 5.21   HEMOGLOBIN g/dL 11.6*   PLATELETS 10*3/mm3 98*                   ASSESSMENT:     Acute CHF (congestive heart failure) (CMS/[facility])    1.  CKD3:  Stable. Volume excess peripherally and centrally, though apparently this is much improved from just 2 weeks ago (when hospitalized and had a 20-pound diuresis).   Negative fluid balance with IV furosemide.  Plan urinalysis with no blood and no protein  2.  Chest pressure and known CAD  3.  COPD on continuous O2 3 L/min  4.  Chronic diastolic CHF  5.  Hypertension  6.  DM2: She believes she has had diabetes only 1-2 years        PLAN:  1.  Transition to oral furosemide tomorrow: 40 mg once daily (her home dose had just been 20 mg once daily)  2.  Surveillance labs  3.  Hopefully home soon    Bret Gardiner MD  12/21/2019    7:07 PM
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
## 2019-12-22 NOTE — PLAN OF CARE
Problem: Patient Care Overview  Goal: Plan of Care Review  Outcome: Ongoing (interventions implemented as appropriate)  Flowsheets (Taken 12/22/2019 7871)  Progress: improving  Plan of Care Reviewed With: patient  Outcome Summary: VSS. Telemetry monitor, SR. Up with assist, weak. Alert and oriented x4. PO lasix given. Will continue to monitor.     Problem: Cardiac: Heart Failure (Adult)  Goal: Signs and Symptoms of Listed Potential Problems Will be Absent, Minimized or Managed (Cardiac: Heart Failure)  Outcome: Ongoing (interventions implemented as appropriate)     Problem: Fall Risk (Adult)  Goal: Identify Related Risk Factors and Signs and Symptoms  Outcome: Ongoing (interventions implemented as appropriate)  Goal: Absence of Fall  Outcome: Ongoing (interventions implemented as appropriate)

## 2019-12-23 VITALS
RESPIRATION RATE: 18 BRPM | TEMPERATURE: 98.2 F | WEIGHT: 238.2 LBS | HEART RATE: 72 BPM | DIASTOLIC BLOOD PRESSURE: 79 MMHG | SYSTOLIC BLOOD PRESSURE: 149 MMHG | BODY MASS INDEX: 44.97 KG/M2 | HEIGHT: 61 IN | OXYGEN SATURATION: 99 %

## 2019-12-23 LAB
ALBUMIN SERPL-MCNC: 3.4 G/DL (ref 3.5–5.2)
ANION GAP SERPL CALCULATED.3IONS-SCNC: 13.6 MMOL/L (ref 5–15)
BUN BLD-MCNC: 20 MG/DL (ref 8–23)
BUN/CREAT SERPL: 12.8 (ref 7–25)
CALCIUM SPEC-SCNC: 8.3 MG/DL (ref 8.6–10.5)
CHLORIDE SERPL-SCNC: 95 MMOL/L (ref 98–107)
CO2 SERPL-SCNC: 29.4 MMOL/L (ref 22–29)
CREAT BLD-MCNC: 1.56 MG/DL (ref 0.57–1)
GFR SERPL CREATININE-BSD FRML MDRD: 33 ML/MIN/1.73
GLUCOSE BLD-MCNC: 172 MG/DL (ref 65–99)
GLUCOSE BLDC GLUCOMTR-MCNC: 149 MG/DL (ref 70–130)
GLUCOSE BLDC GLUCOMTR-MCNC: 175 MG/DL (ref 70–130)
MAGNESIUM SERPL-MCNC: 2 MG/DL (ref 1.6–2.4)
PHOSPHATE SERPL-MCNC: 3.3 MG/DL (ref 2.5–4.5)
POTASSIUM BLD-SCNC: 3.7 MMOL/L (ref 3.5–5.2)
SODIUM BLD-SCNC: 138 MMOL/L (ref 136–145)

## 2019-12-23 PROCEDURE — 94799 UNLISTED PULMONARY SVC/PX: CPT

## 2019-12-23 PROCEDURE — 82962 GLUCOSE BLOOD TEST: CPT

## 2019-12-23 PROCEDURE — 25010000002 HEPARIN (PORCINE) PER 1000 UNITS: Performed by: INTERNAL MEDICINE

## 2019-12-23 PROCEDURE — 63710000001 INSULIN LISPRO (HUMAN) PER 5 UNITS: Performed by: INTERNAL MEDICINE

## 2019-12-23 PROCEDURE — 83735 ASSAY OF MAGNESIUM: CPT | Performed by: INTERNAL MEDICINE

## 2019-12-23 PROCEDURE — G0378 HOSPITAL OBSERVATION PER HR: HCPCS

## 2019-12-23 PROCEDURE — 99239 HOSP IP/OBS DSCHRG MGMT >30: CPT | Performed by: INTERNAL MEDICINE

## 2019-12-23 PROCEDURE — 80069 RENAL FUNCTION PANEL: CPT | Performed by: INTERNAL MEDICINE

## 2019-12-23 RX ORDER — METOPROLOL TARTRATE 50 MG/1
50 TABLET, FILM COATED ORAL 2 TIMES DAILY
Qty: 60 TABLET | Refills: 11 | Status: SHIPPED | OUTPATIENT
Start: 2019-12-23 | End: 2020-10-08

## 2019-12-23 RX ORDER — FUROSEMIDE 40 MG/1
40 TABLET ORAL 2 TIMES DAILY
Qty: 60 TABLET | Refills: 11 | Status: SHIPPED | OUTPATIENT
Start: 2019-12-23 | End: 2020-10-08

## 2019-12-23 RX ADMIN — BUDESONIDE AND FORMOTEROL FUMARATE DIHYDRATE 2 PUFF: 80; 4.5 AEROSOL RESPIRATORY (INHALATION) at 09:57

## 2019-12-23 RX ADMIN — FUROSEMIDE 80 MG: 80 TABLET ORAL at 08:08

## 2019-12-23 RX ADMIN — FAMOTIDINE 20 MG: 20 TABLET, FILM COATED ORAL at 08:08

## 2019-12-23 RX ADMIN — ASPIRIN 81 MG: 81 TABLET, COATED ORAL at 08:08

## 2019-12-23 RX ADMIN — TERAZOSIN HYDROCHLORIDE ANHYDROUS 2 MG: 2 CAPSULE ORAL at 08:07

## 2019-12-23 RX ADMIN — METOCLOPRAMIDE 5 MG: 5 TABLET ORAL at 06:49

## 2019-12-23 RX ADMIN — INSULIN LISPRO 2 UNITS: 100 INJECTION, SOLUTION INTRAVENOUS; SUBCUTANEOUS at 08:09

## 2019-12-23 RX ADMIN — METOPROLOL TARTRATE 50 MG: 50 TABLET, FILM COATED ORAL at 08:08

## 2019-12-23 RX ADMIN — POTASSIUM CHLORIDE 10 MEQ: 750 CAPSULE, EXTENDED RELEASE ORAL at 08:08

## 2019-12-23 NOTE — DISCHARGE SUMMARY
Date of Admission: 12/20/2019    Date of Discharge:  12/23/2019    Discharge Diagnoses:   1.  Acute on chronic diastolic CHF  2.  Acute on chronic right-sided heart failure  3.  Severe COPD, on 3 L home oxygen  4.  Stage III chronic kidney disease  5.  Hypertension  6.  Diabetes  7.  Coronary artery disease, status post 3 stents in 2003 and 2004   8.  History of vasovagal syncope      Procedures Performed:  1. Echocardiogram on 12/20/2019       Consults     Date and Time Order Name Status Description    12/20/2019 1348 Inpatient Nephrology Consult Completed           Hospital Course:     This is a very pleasant 74 year-old white female who I follow in the office in [place], [place].  She has a history of chronic diastolic CHF, coronary artery disease, severe COPD, and stage III chronic kidney disease.      The patient had recently been admitted to the [facility] in [place] with congestive heart failure.  She was diuresed and discharged, although when she followed up with me on 12/18/2019, she was clearly volume overloaded.  She increased her Lasix for the next 2 days, although she received no relief.  She ultimately was directly admitted for acute on chronic diastolic CHF and acute on chronic right-sided heart failure on 12/20/2019.  An echocardiogram performed at that time showed an ejection fraction of 64%, moderate right ventricular enlargement, and a calculated RVSP of 38 mmHg.  She was diuresed with IV Lasix, and had a good response to this.  She improved over time.  She was also hypertensive during her hospital stay, and her metoprolol was ultimately increased to 50 mg twice a day.  Her Lasix at home had been 20 mg/day, although she is going to take 40 mg/day for now.  This can be increased to 40 mg twice a day if needed.  Dr. Gardiner from nephrology also followed the patient, and her renal function remained fairly stable.  I told her that if she begins to gain weight or becomes more short of breath, she should call  and ask for the triage nurse in my office.  She does have rales at her bases, although I am not convinced that this is not more from her lungs than from true volume overload.  Follow-up has already been arranged for her on 1/29/2020.  She is going to go to the lab on 12/30/2019 in [place] for a BMP.          Discharge Medications:     Discharge Medications      Changes to Medications      Instructions Start Date   furosemide 40 MG tablet  Commonly known as:  LASIX  What changed:    · medication strength  · how much to take  · when to take this   40 mg, Oral, 2 Times Daily      metoprolol tartrate 50 MG tablet  Commonly known as:  LOPRESSOR  What changed:    · medication strength  · how much to take   50 mg, Oral, 2 Times Daily         Continue These Medications      Instructions Start Date   albuterol (2.5 MG/3ML) 0.083% nebulizer solution  Commonly known as:  PROVENTIL   USE 1 VIAL VIA NEBULIZER EVERY 4 HOURS      aspirin 81 MG tablet   81 mg, Oral, Daily      atorvastatin 10 MG tablet  Commonly known as:  LIPITOR   10 mg, Oral, Daily      BREO ELLIPTA 100-25 MCG/INH inhaler  Generic drug:  Fluticasone Furoate-Vilanterol   1 EACH IH DAILY      clonazePAM 0.5 MG tablet  Commonly known as:  KlonoPIN   0.5 mg, Oral, 2 Times Daily      DEXILANT 60 MG capsule  Generic drug:  dexlansoprazole   60 mg, Oral, Daily      estradiol 0.1 MG/GM vaginal cream  Commonly known as:  ESTRACE   2 g, Vaginal, 2 Times Weekly, Sunday and Thursday       JANUVIA 100 MG tablet  Generic drug:  SITagliptin   100 mg, Oral, Daily      metoclopramide 5 MG tablet  Commonly known as:  REGLAN   5 mg, Oral, 4 Times Daily Before Meals & Nightly      MYRBETRIQ 50 MG tablet sustained-release 24 hour 24 hr tablet  Generic drug:  Mirabegron ER   50 mg, Oral, Daily      nitroglycerin 0.4 MG SL tablet  Commonly known as:  NITROSTAT   1 under the tongue as needed for angina, may repeat q5mins for up three doses      potassium chloride 10 MEQ CR  tablet  Commonly known as:  K-DUR   10 mEq, Oral, 2 Times Daily      primidone 50 MG tablet  Commonly known as:  MYSOLINE   25 mg, Oral, Nightly      raNITIdine 300 MG tablet  Commonly known as:  ZANTAC   300 mg, Oral, Nightly      silodosin 8 MG capsule capsule  Commonly known as:  RAPAFLO   8 mg, Oral, Daily               Follow-up:  Future Appointments   Date Time Provider Department Center   1/29/2020 11:40 AM Jonatan Dougherty MD [facility] None   3/25/2020 10:00 AM [facility]        Physical Exam:   General Appearance:    No acute distress, alert and oriented x4   Lungs:     Rales at bases bilaterally     Heart:    Regular rhythm and normal rate.  No murmurs, gallops, or       rubs.   Abdomen:     Soft, non-tender, non-distended.    Extremities:   1+ edema of the lower extremities (right greater than left)        Time Spent on Discharge: Greater than 30 minutes.      Jonatan Dougherty MD  12/23/19  9:54 AM

## 2019-12-24 ENCOUNTER — READMISSION MANAGEMENT (OUTPATIENT)
Dept: CALL CENTER | Facility: HOSPITAL | Age: 74
End: 2019-12-24

## 2019-12-24 NOTE — OUTREACH NOTE
Prep Survey      Responses   Facility patient discharged from?  [facility]   Is patient eligible?  Yes   Discharge diagnosis  A/C diastolic CHF, A/C right-sided HF, severe COPD, CKD III, HTN, DM, CAD, hx vasovagal syncope   Does the patient have one of the following disease processes/diagnoses(primary or secondary)?  CHF [LACE <7]   Does the patient have Home health ordered?  No   Is there a DME ordered?  No   Comments regarding appointments  Pt to schedule follow up with PCP   Prep survey completed?  Yes          Susanna Mishra RN

## 2019-12-24 NOTE — PROGRESS NOTES
Case Management Discharge Note      Final Note: Patient was Dc'd home with spouse 12/23.    Provided post acute provider list?: Refused(offered list for HH near home from Medicare.gov and pt and spouse declined)         Transportation Services  Private: Car    Final Discharge Disposition Code: 01 - home or self-care

## 2019-12-27 ENCOUNTER — READMISSION MANAGEMENT (OUTPATIENT)
Dept: CALL CENTER | Facility: HOSPITAL | Age: 74
End: 2019-12-27

## 2019-12-27 NOTE — OUTREACH NOTE
CHF Week 1 Survey      Responses   Facility patient discharged from?  [facility]   Does the patient have one of the following disease processes/diagnoses(primary or secondary)?  CHF   Is there a successful TCM telephone encounter documented?  No   CHF Week 1 attempt successful?  No   Unsuccessful attempts  Attempt 1          Bessie Wiley, RN

## 2019-12-30 ENCOUNTER — READMISSION MANAGEMENT (OUTPATIENT)
Dept: CALL CENTER | Facility: HOSPITAL | Age: 74
End: 2019-12-30

## 2019-12-30 NOTE — OUTREACH NOTE
CHF Week 1 Survey      Responses   Facility patient discharged from?  [facility]   Does the patient have one of the following disease processes/diagnoses(primary or secondary)?  CHF   Is there a successful TCM telephone encounter documented?  No   CHF Week 1 attempt successful?  Yes   Call start time  1445   Call end time  1448   Discharge diagnosis  A/C diastolic CHF, A/C right-sided HF, severe COPD, CKD III, HTN, DM, CAD, hx vasovagal syncope   Meds reviewed with patient/caregiver?  Yes   Is the patient having any side effects they believe may be caused by any medication additions or changes?  No   Does the patient have all medications ordered at discharge?  Yes   Is the patient taking all medications as directed (includes completed medication regime)?  Yes   Does the patient have a primary care provider?   Yes   Does the patient have an appointment with their PCP within 7 days of discharge?  Greater than 7 days   Comments regarding PCP  Pt will see PCP next week.    Has the patient kept scheduled appointments due by today?  N/A   Comments  pt reports she had labwork done this morning.    Psychosocial issues?  No   Did the patient receive a copy of their discharge instructions?  Yes   Nursing interventions  Reviewed instructions with patient   What is the patient's perception of their health status since discharge?  Improving   Is the patient weighing daily?  Yes   Does the patient have scales?  Yes   Is the patient able to teach back Heart Failure diet management?  Yes   Is the patient able to teach back Heart Failure Zones?  Yes   Is the patient able to teach back signs and symptoms of worsening condition? (i.e. weight gain, shortness of air, etc.)  Yes   Is the patient/caregiver able to teach back the hierarchy of who to call/visit for symptoms/problems? PCP, Specialist, Home health nurse, Urgent Care, ED, 911  Yes    CHF Week 1 call completed?  Yes   Wrap up additional comments  Pt reports she is doing ok.   She denies any concerns at this time.           Wendy Mercer RN

## 2020-01-15 NOTE — PROGRESS NOTES
Date of Office Visit: 2019  Encounter Provider: Jonatan Dougherty MD  Place of Service: [facility] CARDIOLOGY  Patient Name: Rachana Membreno  :1945    Chief complaint: Follow-up for coronary artery disease, carotid artery disease,   hypertension, and prior vasovagal syncope.    History of Present Illness:    I had the pleasure of seeing the patient in cardiology office on 2019.  She is a very pleasant 74 year-old white female with a past medical history significant for coronary artery disease, carotid artery disease, severe COPD, and vasovagal syncope.  The patient formerly followed with Dr. Jung, but switched care to me on 2019.    The patient has a history of 2 separate myocardial infarctions in  and , respectively.  She had a total of 3 stents placed during those occasions by Dr. Watt.  Her anginal equivalent during those occasions was left arm and upper back pain.  She actually never had chest pain.  She also has a history of carotid artery disease, and vasovagal syncope which was proven by a tilt table test.  She has done well with the vasovagal syncope since the addition of a beta-blocker.  She also has a history of orthostatic hypotension remotely.  She has severe COPD, and is on 3 L of oxygen chronically.  She also has a history of chronic lower extremity edema at baseline.    The patient presents for follow-up.  [facility] on exertion and chest pressure.  She had a Lexiscan Myoview stress test and echocardiogram on 2019, neither of which showed significant pathology.  However, she appears to be volume overloaded today with significant lower extremity edema more than her baseline.  She also visibly appears to be short of breath.  She still has pressure with exertion, although the shortness of breath is more significant.    Past Medical History:    Diagnosis Date   • Breast cancer (CMS/[facility])    • Carotid artery disease (CMS/[facility])    • COPD (chronic obstructive pulmonary disease) (CMS/[facility])    • Coronary artery disease     MI in 2003 and 2004 - status post 3 stents by Dr. Watt    • Hyperlipidemia    • Hypertension    • Obesity    • Orthostatic hypotension    • Vasovagal syncope        Past Surgical History:   Procedure Laterality Date   • BREAST LUMPECTOMY Right    • CAROTID STENT         Current Outpatient Medications on File Prior to Visit   Medication Sig Dispense Refill   • albuterol (PROVENTIL) (2.5 MG/3ML) 0.083% nebulizer solution USE 1 VIAL VIA NEBULIZER EVERY 4 HOURS  5   • aspirin 81 MG tablet Take 81 mg by mouth daily.       • atorvastatin (LIPITOR) 10 MG tablet Take 10 mg by mouth daily.       • BREO ELLIPTA 100-25 MCG/INH aerosol powder  1 EACH IH DAILY  3   • clonazePAM (KlonoPIN) 0.5 MG tablet Take 0.5 mg by mouth 2 (Two) Times a Day.  1   • DEXILANT 60 MG capsule Take 60 mg by mouth Daily.     • metoclopramide (REGLAN) 5 MG tablet Take 5 mg by mouth 4 (Four) Times a Day Before Meals & at Bedtime.     • nitroglycerin (NITROSTAT) 0.4 MG SL tablet 1 under the tongue as needed for angina, may repeat q5mins for up three doses 25 tablet 6     No current facility-administered medications on file prior to visit.      Allergies as of 12/18/2019 - Reviewed 09/15/2019   Allergen Reaction Noted   • Citalopram Anaphylaxis 02/08/2018   • Alprazolam Angioedema 02/08/2018   • Azithromycin  01/14/2016   • Bactrim [sulfamethoxazole-trimethoprim]  01/14/2016   • Cefuroxime  01/14/2016   • Ciprofloxacin  01/14/2016   • Clopidogrel  01/14/2016   • Doxycycline Irritability 02/08/2018   • Gabapentin  01/14/2016   • Latex  01/14/2016   • Sulfamethoxazole  03/31/2016   • Zyrtec [cetirizine]  01/14/2016     Social History     Socioeconomic History   • Marital status:      Spouse name: Not on file   • Number of children: Not on file   • Years of education: Not  "on file   • Highest education level: Not on file   Tobacco Use   • Smoking status: Former Smoker     Last attempt to quit: 2005     Years since quitting: 15.0   • Smokeless tobacco: Never Used   Substance and Sexual Activity   • Alcohol use: No   • Drug use: No   • Sexual activity: Defer     Family History   Problem Relation Age of Onset   • Heart disease Father        Review of Systems   Constitution: Positive for malaise/fatigue.   Cardiovascular: Positive for dyspnea on exertion and leg swelling.   Musculoskeletal: Positive for back pain and joint pain.   Neurological: Positive for weakness.   All other systems reviewed and are negative.     Objective:     Vitals:    12/18/19 1325   BP: 114/68   Pulse: 70   SpO2: 94%   Height: 154.9 cm (60.98\")     Body mass index is 47.26 kg/m².    Physical Exam   Constitutional: She is oriented to person, place, and time. She appears well-developed.   Obese   HENT:   Head: Normocephalic and atraumatic.   Eyes: Conjunctivae are normal.   Neck: Neck supple.   Cardiovascular: Normal rate and regular rhythm. Exam reveals no gallop and no friction rub.   Murmur heard.   Systolic murmur is present with a grade of 2/6 at the upper right sternal border and upper left sternal border.  Pulmonary/Chest: Effort normal. She has decreased breath sounds.   Abdominal: Soft. There is no tenderness.   Musculoskeletal:   2+ edema of the lower extremities (chronic)   Neurological: She is alert and oriented to person, place, and time.   Skin: Skin is warm.   Psychiatric: She has a normal mood and affect. Her behavior is normal.     Lab Review:   Procedures    Cardiac Procedures:  1.  Echocardiogram on 5/17/2017: The ejection fraction was 60%.  The left ventricle was   mildly enlarged.  There was moderate left atrial enlargement.  The right ventricle was   normal.  There was a small pericardial effusion.  2.  Lexiscan Myoview stress test on 11/25/2019 at [facility]: Normal.  3.  Echocardiogram on " 11/25/2019 at [facility]: The ejection fraction was 60 to 65%.  There   was grade 1 diastolic dysfunction.  The right ventricle was normal.    Assessment:       Diagnosis Plan   1. Coronary artery disease involving native coronary artery of native heart without angina pectoris     2. Carotid artery disease, unspecified laterality, unspecified type (CMS/HCC)     3. Essential hypertension     4. Vasovagal syncope       Plan:       I had a long and detailed discussion with the patient today.  She clearly appears to be volume overloaded more than normal.  She is also more short of breath and she has had some chest pressure.  Her echocardiogram and Lexiscan Myoview stress test on 11/25/2019 did not show significant pathology.  However, I strongly suspect that she has some component of diastolic congestive heart failure currently.  I have asked her to double her Lasix over the next several days.  However, if she is not better in the next several days, I will need to admit her for IV diuretics.  The right ventricle was read as being normal on her echocardiogram, although she does have significant COPD, and right ventricular dysfunction certainly could be playing a role here.  I would hold off on cardiac catheterization unless absolutely necessary.  She will continue on the aspirin and Lipitor.    I spent 30 minutes in the care of the patient.  Greater than 50% of this time was spent face-to-face counseling with the patient regarding her likely diastolic congestive heart failure, volume overload, and management of her symptoms.

## 2020-01-29 ENCOUNTER — OFFICE VISIT (OUTPATIENT)
Dept: CARDIOLOGY | Facility: CLINIC | Age: 75
End: 2020-01-29

## 2020-01-29 VITALS
WEIGHT: 234.8 LBS | OXYGEN SATURATION: 93 % | SYSTOLIC BLOOD PRESSURE: 128 MMHG | HEIGHT: 61 IN | DIASTOLIC BLOOD PRESSURE: 78 MMHG | HEART RATE: 66 BPM | BODY MASS INDEX: 44.33 KG/M2

## 2020-01-29 DIAGNOSIS — I77.9 CAROTID ARTERY DISEASE, UNSPECIFIED LATERALITY, UNSPECIFIED TYPE (HCC): ICD-10-CM

## 2020-01-29 DIAGNOSIS — I25.10 CORONARY ARTERY DISEASE INVOLVING NATIVE CORONARY ARTERY OF NATIVE HEART WITHOUT ANGINA PECTORIS: ICD-10-CM

## 2020-01-29 DIAGNOSIS — I10 ESSENTIAL HYPERTENSION: ICD-10-CM

## 2020-01-29 DIAGNOSIS — I50.32 CHRONIC DIASTOLIC CONGESTIVE HEART FAILURE (HCC): ICD-10-CM

## 2020-01-29 DIAGNOSIS — I50.812 CHRONIC RIGHT-SIDED CONGESTIVE HEART FAILURE (HCC): Primary | ICD-10-CM

## 2020-01-29 DIAGNOSIS — R55 VASOVAGAL SYNCOPE: ICD-10-CM

## 2020-01-29 PROCEDURE — 99213 OFFICE O/P EST LOW 20 MIN: CPT | Performed by: INTERNAL MEDICINE

## 2020-02-09 NOTE — PROGRESS NOTES
Date of Office Visit:  2020  Encounter Provider: Jonatan Dougherty MD  Place of Service: [facility] CARDIOLOGY  Patient Name: Rachana Membreno  :1945    Chief complaint: Follow-up for coronary artery disease, carotid artery disease,   hypertension, and prior vasovagal syncope.    History of Present Illness:    I had the pleasure of seeing the patient in cardiology office on 2020.  She is a very pleasant 74 year-old white female with a past medical history significant for coronary artery disease, carotid artery disease, severe COPD, and vasovagal syncope.  The patient formerly followed with Dr. Jung, but switched care to me on 2019.    The patient has a history of 2 separate myocardial infarctions in  and , respectively.  She had a total of 3 stents placed during those occasions by Dr. Watt.  Her anginal equivalent during those occasions was left arm and upper back pain.  She actually never had chest pain.  She also has a history of carotid artery disease, and vasovagal syncope which was proven by a tilt table test.  She has done well with the vasovagal syncope since the addition of a beta-blocker.  She also has a history of orthostatic hypotension remotely.  She has severe COPD, and is on 3 L of oxygen chronically.  She also has a history of chronic lower extremity edema at baseline.    The patient was hospitalized at [facility] in late 2019 with volume overload and congestive heart failure.  When I saw her in the office on 2019, she was still having significant shortness of breath and chest pressure.  Her edema was also worse.  She was directly admitted to the hospital on 2019.  She diuresed well with IV Lasix, and her weight improved over time.  She did have some chronic lower extremity edema that was consistent at the time of discharge.  Her Lasix ultimately was increased to 40 mg twice a day, although her renal function  worsened, and this had to be decreased back to 40 mg once a day.  She was also hypertensive, and her blood pressure medications were adjusted during her hospital stay.  An echocardiogram was performed on 12/20/2019, and showed a normal ejection fraction, although the right ventricle was moderately enlarged.  It was felt that she had significant right-sided heart failure from COPD, as well as acute on chronic diastolic CHF.    The patient presents today for follow-up.  She has done better since she went home, although she still has baseline shortness of breath and baseline edema.  However, she brought in her daily weight log, and her weight has been very consistent over the last 2 to 3 weeks.  She has not had chest pressure since she was discharged.    Past Medical History:   Diagnosis Date   • Breast cancer (CMS/HCC)    • Carotid artery disease (CMS/HCC)    • COPD (chronic obstructive pulmonary disease) (CMS/[facility])    • Coronary artery disease     MI in 2003 and 2004 - status post 3 stents by Dr. Watt    • Hyperlipidemia    • Hypertension    • Obesity    • Orthostatic hypotension    • Vasovagal syncope        Past Surgical History:   Procedure Laterality Date   • BREAST LUMPECTOMY Right    • CAROTID STENT         Current Outpatient Medications on File Prior to Visit   Medication Sig Dispense Refill   • albuterol (PROVENTIL) (2.5 MG/3ML) 0.083% nebulizer solution USE 1 VIAL VIA NEBULIZER EVERY 4 HOURS  5   • aspirin 81 MG tablet Take 81 mg by mouth daily.       • atorvastatin (LIPITOR) 10 MG tablet Take 10 mg by mouth daily.       • BREO ELLIPTA 100-25 MCG/INH aerosol powder  1 EACH IH DAILY  3   • clonazePAM (KlonoPIN) 0.5 MG tablet Take 0.5 mg by mouth 2 (Two) Times a Day.  1   • DEXILANT 60 MG capsule Take 60 mg by mouth Daily.     • estradiol (ESTRACE) 0.1 MG/GM vaginal cream Insert 2 g into the vagina 2 (Two) Times a Week. Sunday and Thursday     • furosemide (LASIX) 40 MG tablet Take 1 tablet by mouth 2  (Two) Times a Day. 60 tablet 11   • JANUVIA 100 MG tablet Take 100 mg by mouth Daily.  5   • metoclopramide (REGLAN) 5 MG tablet Take 5 mg by mouth 4 (Four) Times a Day Before Meals & at Bedtime.     • metoprolol tartrate (LOPRESSOR) 50 MG tablet Take 1 tablet by mouth 2 (Two) Times a Day. 60 tablet 11   • MYRBETRIQ 50 MG tablet sustained-release 24 hour 24 hr tablet Take 50 mg by mouth Daily.  3   • nitroglycerin (NITROSTAT) 0.4 MG SL tablet 1 under the tongue as needed for angina, may repeat q5mins for up three doses 25 tablet 6   • potassium chloride (K-DUR) 10 MEQ CR tablet Take 10 mEq by mouth 2 (Two) Times a Day.  10   • primidone (MYSOLINE) 50 MG tablet Take 25 mg by mouth Every Night.  1   • raNITIdine (ZANTAC) 300 MG tablet Take 300 mg by mouth Every Night.  6   • silodosin (RAPAFLO) 8 MG capsule capsule Take 8 mg by mouth Daily.  0     No current facility-administered medications on file prior to visit.      Allergies as of 01/29/2020 - Reviewed 01/14/2020   Allergen Reaction Noted   • Citalopram Anaphylaxis 02/08/2018   • Alprazolam Angioedema 02/08/2018   • Azithromycin  01/14/2016   • Bactrim [sulfamethoxazole-trimethoprim]  01/14/2016   • Cefuroxime  01/14/2016   • Cephalexin Diarrhea 12/20/2019   • Ciprofloxacin  01/14/2016   • Clopidogrel  01/14/2016   • Doxycycline Irritability 02/08/2018   • Flomax [tamsulosin hcl] Other (See Comments) 12/20/2019   • Gabapentin  01/14/2016   • Latex  01/14/2016   • Oxybutynin Unknown - Low Severity 12/20/2019   • Sulfamethoxazole  03/31/2016   • Trimethoprim Other (See Comments) 12/20/2019   • Zyrtec [cetirizine]  01/14/2016     Social History     Socioeconomic History   • Marital status:      Spouse name: Not on file   • Number of children: Not on file   • Years of education: Not on file   • Highest education level: Not on file   Tobacco Use   • Smoking status: Former Smoker     Last attempt to quit: 2005     Years since quitting: 15.1   • Smokeless  "tobacco: Never Used   Substance and Sexual Activity   • Alcohol use: No   • Drug use: No   • Sexual activity: Defer     Family History   Problem Relation Age of Onset   • Heart disease Father        Review of Systems   Constitution: Positive for malaise/fatigue.   Cardiovascular: Positive for dyspnea on exertion and leg swelling.   Musculoskeletal: Positive for back pain and joint pain.   Neurological: Positive for dizziness.   All other systems reviewed and are negative.     Objective:     Vitals:    01/29/20 1120   BP: 128/78   Pulse: 66   SpO2: 93%   Weight: 107 kg (234 lb 12.8 oz)   Height: 154.9 cm (60.98\")     Body mass index is 44.39 kg/m².    Physical Exam   Constitutional: She is oriented to person, place, and time. She appears well-developed.   Obese   HENT:   Head: Normocephalic and atraumatic.   Eyes: Conjunctivae are normal.   Neck: Neck supple.   Cardiovascular: Normal rate and regular rhythm. Exam reveals no gallop and no friction rub.   Murmur heard.   Systolic murmur is present with a grade of 2/6 at the upper right sternal border and upper left sternal border.  Pulmonary/Chest: Effort normal. She has decreased breath sounds.   Abdominal: Soft. There is no tenderness.   Musculoskeletal:   1-2+ edema of the lower extremities (chronic)   Neurological: She is alert and oriented to person, place, and time.   Skin: Skin is warm.   Psychiatric: She has a normal mood and affect. Her behavior is normal.     Lab Review:   Procedures    Cardiac Procedures:  1.  Echocardiogram on 5/17/2017: The ejection fraction was 60%.  The left ventricle was   mildly enlarged.  There was moderate left atrial enlargement.  The right ventricle was   normal.  There was a small pericardial effusion.  2.  Lexiscan Myoview stress test on 11/25/2019 at [facility]: Normal.  3.  Echocardiogram on 11/25/2019 at [facility]: The ejection fraction was 60 to 65%.  There   was grade 1 diastolic dysfunction.  The right ventricle was normal.  4.  " Echocardiogram on 12/20/2019: The ejection fraction was 64%.  The right ventricle   was moderately enlarged.  There was mild mitral regurgitation.  There was mild tricuspid   regurgitation.  The calculated RVSP was 38 mmHg.    Assessment:       Diagnosis Plan   1. Chronic right-sided congestive heart failure (CMS/HCC)     2. Chronic diastolic congestive heart failure (CMS/HCC)     3. Essential hypertension     4. Coronary artery disease involving native coronary artery of native heart without angina pectoris     5. Carotid artery disease, unspecified laterality, unspecified type (CMS/HCC)     6. Vasovagal syncope       Plan:       Again, I feel that she has a combination of right-sided congestive heart failure and diastolic congestive heart failure.  I reviewed her echocardiogram from her recent admission, and her right ventricle is definitely enlarged.  This stems from her severe COPD.  She likely will always have some chronic lower extremity edema because of this, her size, and venous insufficiency.  However, she is much better than when she was in the hospital initially.  I am going to keep her on the Lasix at 40 mg once a day, and I am going to recheck a BMP.  She does have chronic kidney disease, and this will need to be watched closely.  Her renal function declined when she was on 40 mg twice a day, and this had to be decreased again.  Her blood pressure is better controlled since adjusting her medications in the hospital.  She will continue on the metoprolol at 50 mg twice a day.  She is not having any anginal symptoms, and she will continue on the aspirin and Lipitor for her history of coronary artery disease.  I did not change any of her medications today.  I will see her back in 3 months.

## 2020-09-23 ENCOUNTER — OFFICE VISIT (OUTPATIENT)
Dept: CARDIOLOGY | Facility: CLINIC | Age: 75
End: 2020-09-23

## 2020-09-23 VITALS
DIASTOLIC BLOOD PRESSURE: 78 MMHG | BODY MASS INDEX: 44.39 KG/M2 | HEART RATE: 61 BPM | SYSTOLIC BLOOD PRESSURE: 128 MMHG | HEIGHT: 61 IN | OXYGEN SATURATION: 94 %

## 2020-09-23 DIAGNOSIS — I77.9 CAROTID ARTERY DISEASE, UNSPECIFIED LATERALITY, UNSPECIFIED TYPE (HCC): ICD-10-CM

## 2020-09-23 DIAGNOSIS — R55 VASOVAGAL SYNCOPE: ICD-10-CM

## 2020-09-23 DIAGNOSIS — I10 ESSENTIAL HYPERTENSION: ICD-10-CM

## 2020-09-23 DIAGNOSIS — I25.10 CORONARY ARTERY DISEASE INVOLVING NATIVE CORONARY ARTERY OF NATIVE HEART WITHOUT ANGINA PECTORIS: Primary | ICD-10-CM

## 2020-09-23 PROCEDURE — 99213 OFFICE O/P EST LOW 20 MIN: CPT | Performed by: INTERNAL MEDICINE

## 2020-09-23 RX ORDER — FAMOTIDINE 20 MG/1
20 TABLET, FILM COATED ORAL 2 TIMES DAILY
COMMUNITY
Start: 2020-08-25

## 2020-09-23 RX ORDER — PRIMIDONE 50 MG/1
25 TABLET ORAL NIGHTLY
Qty: 15 TABLET | Refills: 6 | Status: SHIPPED | OUTPATIENT
Start: 2020-09-23

## 2020-09-23 RX ORDER — TERAZOSIN 2 MG/1
2 CAPSULE ORAL 2 TIMES DAILY
COMMUNITY

## 2020-09-23 RX ORDER — BUPROPION HYDROCHLORIDE 150 MG/1
150 TABLET ORAL DAILY
COMMUNITY

## 2020-09-26 NOTE — PROGRESS NOTES
Date of Office Visit:  2020  Encounter Provider: Jonatan Dougherty MD  Place of Service: [facility] CARDIOLOGY  Patient Name: Rachana Membreno  :1945    Chief complaint: Follow-up for coronary artery disease, carotid artery disease,   hypertension, and prior vasovagal syncope.    History of Present Illness:    I had the pleasure of seeing the patient in cardiology office on 2020.  She is a very pleasant 75 year-old white female with a past medical history significant for coronary artery disease, carotid artery disease, severe COPD, and vasovagal syncope.  The patient formerly followed with Dr. Jung, but switched care to me on 2019.    The patient has a history of 2 separate myocardial infarctions in  and , respectively.  She had a total of 3 stents placed during those occasions by Dr. Watt.  Her anginal equivalent during those occasions was left arm and upper back pain.  She actually never had chest pain.  She also has a history of carotid artery disease, and vasovagal syncope which was proven by a tilt table test.  She has done well with the vasovagal syncope since the addition of a beta-blocker.  She also has a history of orthostatic hypotension remotely.  She has severe COPD, and is on 3 L of oxygen chronically.  She also has a history of chronic lower extremity edema at baseline.    The patient was hospitalized at [facility] in late 2019 with volume overload and congestive heart failure.  When I saw her in the office on 2019, she was still having significant shortness of breath and chest pressure.  Her edema was also worse.  She was directly admitted to the hospital on 2019.  She diuresed well with IV Lasix, and her weight improved over time.  She did have some chronic lower extremity edema that was consistent at the time of discharge.  Her Lasix ultimately was increased to 40 mg twice a day, although her renal function  worsened, and this had to be decreased back to 40 mg once a day.  She was also hypertensive, and her blood pressure medications were adjusted during her hospital stay.  An echocardiogram was performed on 12/20/2019, and showed a normal ejection fraction, although the right ventricle was moderately enlarged.  It was felt that she had significant right-sided heart failure from COPD, as well as acute on chronic diastolic CHF.    The patient presents today for follow-up.  Her shortness of breath is worsened slightly, although she remains on 3 L of oxygen.  She has had no further chest pain since July 2020 when she was hospitalized with chest discomfort.  Her stress test and echocardiogram were both relatively unremarkable.  Her edema is minimal today.    Past Medical History:   Diagnosis Date   • Breast cancer (CMS/[facility])    • Carotid artery disease (CMS/[facility])    • COPD (chronic obstructive pulmonary disease) (CMS/[facility])    • Coronary artery disease     MI in 2003 and 2004 - status post 3 stents by Dr. Watt    • Hyperlipidemia    • Hypertension    • Obesity    • Orthostatic hypotension    • Vasovagal syncope        Past Surgical History:   Procedure Laterality Date   • BREAST LUMPECTOMY Right    • CAROTID STENT         Current Outpatient Medications on File Prior to Visit   Medication Sig Dispense Refill   • albuterol (PROVENTIL) (2.5 MG/3ML) 0.083% nebulizer solution USE 1 VIAL VIA NEBULIZER EVERY 4 HOURS  5   • aspirin 81 MG tablet Take 81 mg by mouth daily.       • atorvastatin (LIPITOR) 10 MG tablet Take 10 mg by mouth daily.       • BREO ELLIPTA 100-25 MCG/INH aerosol powder  1 EACH IH DAILY  3   • buPROPion XL (WELLBUTRIN XL) 150 MG 24 hr tablet Take 150 mg by mouth Daily.     • clonazePAM (KlonoPIN) 0.5 MG tablet Take 0.5 mg by mouth 2 (Two) Times a Day.  1   • estradiol (ESTRACE) 0.1 MG/GM vaginal cream Insert 2 g into the vagina 2 (Two) Times a Week. Sunday and Thursday     • furosemide (LASIX) 40 MG tablet  Take 1 tablet by mouth 2 (Two) Times a Day. 60 tablet 11   • JANUVIA 100 MG tablet Take 100 mg by mouth Daily.  5   • metoprolol tartrate (LOPRESSOR) 50 MG tablet Take 1 tablet by mouth 2 (Two) Times a Day. 60 tablet 11   • MYRBETRIQ 50 MG tablet sustained-release 24 hour 24 hr tablet Take 50 mg by mouth Daily.  3   • nitroglycerin (NITROSTAT) 0.4 MG SL tablet 1 under the tongue as needed for angina, may repeat q5mins for up three doses 25 tablet 6   • potassium chloride (K-DUR) 10 MEQ CR tablet Take 10 mEq by mouth 2 (Two) Times a Day.  10   • terazosin (HYTRIN) 2 MG capsule Take 2 mg by mouth 2 (two) times a day.     • famotidine (PEPCID) 20 MG tablet Take 20 mg by mouth 2 (Two) Times a Day.       No current facility-administered medications on file prior to visit.      Allergies as of 09/23/2020 - Reviewed 09/23/2020   Allergen Reaction Noted   • Citalopram Anaphylaxis 02/08/2018   • Alprazolam Angioedema 02/08/2018   • Azithromycin  01/14/2016   • Bactrim [sulfamethoxazole-trimethoprim]  01/14/2016   • Cefuroxime  01/14/2016   • Cephalexin Diarrhea 12/20/2019   • Ciprofloxacin  01/14/2016   • Clopidogrel  01/14/2016   • Doxycycline Irritability 02/08/2018   • Flomax [tamsulosin hcl] Other (See Comments) 12/20/2019   • Gabapentin  01/14/2016   • Latex  01/14/2016   • Metformin Other (See Comments) 09/23/2020   • Oxybutynin Unknown - Low Severity 12/20/2019   • Sulfamethoxazole  03/31/2016   • Trimethoprim Other (See Comments) 12/20/2019   • Zyrtec [cetirizine]  01/14/2016     Social History     Socioeconomic History   • Marital status:      Spouse name: Not on file   • Number of children: Not on file   • Years of education: Not on file   • Highest education level: Not on file   Tobacco Use   • Smoking status: Former Smoker     Quit date: 2005     Years since quitting: 15.7   • Smokeless tobacco: Never Used   Substance and Sexual Activity   • Alcohol use: No   • Drug use: No   • Sexual activity: Defer  "    Family History   Problem Relation Age of Onset   • Heart disease Father        Review of Systems   Constitution: Positive for malaise/fatigue.   Cardiovascular: Positive for dyspnea on exertion and leg swelling.   Respiratory: Positive for shortness of breath.    Musculoskeletal: Positive for back pain and joint pain.   Neurological: Positive for dizziness.   All other systems reviewed and are negative.     Objective:     Vitals:    09/23/20 0938   BP: 128/78   Pulse: 61   SpO2: 94%   Height: 154.9 cm (60.98\")     Body mass index is 44.39 kg/m².    Physical Exam   Constitutional: She is oriented to person, place, and time. She appears well-developed.   Obese   HENT:   Head: Normocephalic and atraumatic.   Eyes: Conjunctivae are normal.   Neck: Neck supple.   Cardiovascular: Normal rate and regular rhythm. Exam reveals no gallop and no friction rub.   Murmur heard.   Systolic murmur is present with a grade of 2/6 at the upper right sternal border and upper left sternal border.  Pulmonary/Chest: Effort normal. She has decreased breath sounds.   Abdominal: Soft. There is no abdominal tenderness.   Musculoskeletal:      Comments: 1+ edema of the lower extremities (chronic)   Neurological: She is alert and oriented to person, place, and time.   Skin: Skin is warm.   Psychiatric: She has a normal mood and affect. Her behavior is normal.     Lab Review:   Procedures    Cardiac Procedures:  1.  Echocardiogram on 5/17/2017: The ejection fraction was 60%.  The left ventricle was   mildly enlarged.  There was moderate left atrial enlargement.  The right ventricle was   normal.  There was a small pericardial effusion.  2.  Lexiscan Myoview stress test on 11/25/2019 at [facility]: Normal.  3.  Echocardiogram on 11/25/2019 at [facility]: The ejection fraction was 60 to 65%.  There   was grade 1 diastolic dysfunction.  The right ventricle was normal.  4.  Echocardiogram on 12/20/2019: The ejection fraction was 64%.  The right ventricle "   was moderately enlarged.  There was mild mitral regurgitation.  There was mild tricuspid   regurgitation.  The calculated RVSP was 38 mmHg.  5.  Echocardiogram on 7/27/2020: Ejection fraction was 60 to 65%.  There was mild LVH.    There was grade 1 diastolic dysfunction.  6.  Lexiscan nuclear stress test on 7/27/2020: No ischemia or infarction.    Assessment:       Diagnosis Plan   1. Coronary artery disease involving native coronary artery of native heart without angina pectoris     2. Carotid artery disease, unspecified laterality, unspecified type (CMS/HCC)     3. Essential hypertension     4. Vasovagal syncope       Plan:       Again, I feel that she has a combination of right-sided congestive heart failure and diastolic congestive heart failure.  This stems from her severe COPD.  She likely will always have some chronic lower extremity edema because of this, her size, and venous insufficiency.  However, she is much better than when she was in past.  I am going to keep her on the Lasix at 40 mg once a day.  She does have chronic kidney disease, and this will need to be watched closely.  Her renal function declined when she was on 40 mg twice a day, and this had to be decreased again.  Her blood pressure is controlled.  She will continue on the metoprolol at 50 mg twice a day.  She is not having any anginal symptoms, and she will continue on the aspirin and Lipitor for her history of coronary artery disease.  I did not change any of her medications today.  I will see her back in 6 months.

## 2020-10-08 RX ORDER — FUROSEMIDE 40 MG/1
TABLET ORAL
Qty: 180 TABLET | Refills: 3 | Status: SHIPPED | OUTPATIENT
Start: 2020-10-08

## 2020-10-08 RX ORDER — METOPROLOL TARTRATE 50 MG/1
TABLET, FILM COATED ORAL
Qty: 180 TABLET | Refills: 3 | Status: SHIPPED | OUTPATIENT
Start: 2020-10-08

## 2021-03-22 RX ORDER — SILODOSIN 8 MG/1
CAPSULE ORAL
COMMUNITY